# Patient Record
Sex: MALE | Race: WHITE | Employment: FULL TIME | URBAN - METROPOLITAN AREA
[De-identification: names, ages, dates, MRNs, and addresses within clinical notes are randomized per-mention and may not be internally consistent; named-entity substitution may affect disease eponyms.]

---

## 2020-02-13 ENCOUNTER — HOSPITAL ENCOUNTER (OUTPATIENT)
Age: 55
Setting detail: OBSERVATION
Discharge: HOME | End: 2020-02-15
Attending: EMERGENCY MEDICINE | Admitting: FAMILY MEDICINE
Payer: OTHER GOVERNMENT

## 2020-02-13 ENCOUNTER — APPOINTMENT (OUTPATIENT)
Dept: CT IMAGING | Age: 55
End: 2020-02-13
Attending: EMERGENCY MEDICINE
Payer: OTHER GOVERNMENT

## 2020-02-13 ENCOUNTER — APPOINTMENT (OUTPATIENT)
Dept: GENERAL RADIOLOGY | Age: 55
End: 2020-02-13
Attending: EMERGENCY MEDICINE
Payer: OTHER GOVERNMENT

## 2020-02-13 DIAGNOSIS — R19.7 NAUSEA VOMITING AND DIARRHEA: ICD-10-CM

## 2020-02-13 DIAGNOSIS — R10.84 ABDOMINAL PAIN, GENERALIZED: ICD-10-CM

## 2020-02-13 DIAGNOSIS — R55 SYNCOPE AND COLLAPSE: Primary | ICD-10-CM

## 2020-02-13 DIAGNOSIS — R11.2 NAUSEA VOMITING AND DIARRHEA: ICD-10-CM

## 2020-02-13 LAB
ALBUMIN SERPL-MCNC: 4.4 G/DL (ref 3.4–5)
ALBUMIN/GLOB SERPL: 1.3 {RATIO} (ref 0.8–1.7)
ALP SERPL-CCNC: 72 U/L (ref 45–117)
ALT SERPL-CCNC: 49 U/L (ref 16–61)
ANION GAP SERPL CALC-SCNC: 9 MMOL/L (ref 3–18)
APPEARANCE UR: CLEAR
AST SERPL-CCNC: 16 U/L (ref 10–38)
ATRIAL RATE: 131 BPM
BASOPHILS # BLD: 0 K/UL (ref 0–0.1)
BASOPHILS NFR BLD: 0 % (ref 0–2)
BILIRUB SERPL-MCNC: 0.5 MG/DL (ref 0.2–1)
BILIRUB UR QL: NEGATIVE
BUN SERPL-MCNC: 29 MG/DL (ref 7–18)
BUN/CREAT SERPL: 21 (ref 12–20)
CALCIUM SERPL-MCNC: 9.8 MG/DL (ref 8.5–10.1)
CALCULATED P AXIS, ECG09: 77 DEGREES
CALCULATED R AXIS, ECG10: 73 DEGREES
CALCULATED T AXIS, ECG11: 73 DEGREES
CHLORIDE SERPL-SCNC: 110 MMOL/L (ref 100–111)
CK MB CFR SERPL CALC: NORMAL % (ref 0–4)
CK MB SERPL-MCNC: <1 NG/ML (ref 5–25)
CK SERPL-CCNC: 82 U/L (ref 39–308)
CO2 SERPL-SCNC: 21 MMOL/L (ref 21–32)
COLOR UR: YELLOW
CREAT SERPL-MCNC: 1.37 MG/DL (ref 0.6–1.3)
DIAGNOSIS, 93000: NORMAL
DIFFERENTIAL METHOD BLD: ABNORMAL
EOSINOPHIL # BLD: 0 K/UL (ref 0–0.4)
EOSINOPHIL NFR BLD: 0 % (ref 0–5)
ERYTHROCYTE [DISTWIDTH] IN BLOOD BY AUTOMATED COUNT: 12.3 % (ref 11.6–14.5)
GLOBULIN SER CALC-MCNC: 3.3 G/DL (ref 2–4)
GLUCOSE SERPL-MCNC: 136 MG/DL (ref 74–99)
GLUCOSE UR STRIP.AUTO-MCNC: NEGATIVE MG/DL
HCT VFR BLD AUTO: 46.2 % (ref 36–48)
HGB BLD-MCNC: 15.8 G/DL (ref 13–16)
HGB UR QL STRIP: NEGATIVE
KETONES UR QL STRIP.AUTO: NEGATIVE MG/DL
LACTATE SERPL-SCNC: 2.6 MMOL/L (ref 0.4–2)
LACTATE SERPL-SCNC: 2.6 MMOL/L (ref 0.4–2)
LEUKOCYTE ESTERASE UR QL STRIP.AUTO: NEGATIVE
LIPASE SERPL-CCNC: 83 U/L (ref 73–393)
LYMPHOCYTES # BLD: 0.4 K/UL (ref 0.9–3.6)
LYMPHOCYTES NFR BLD: 2 % (ref 21–52)
MCH RBC QN AUTO: 30.6 PG (ref 24–34)
MCHC RBC AUTO-ENTMCNC: 34.2 G/DL (ref 31–37)
MCV RBC AUTO: 89.4 FL (ref 74–97)
MONOCYTES # BLD: 0.5 K/UL (ref 0.05–1.2)
MONOCYTES NFR BLD: 3 % (ref 3–10)
NEUTS SEG # BLD: 17.8 K/UL (ref 1.8–8)
NEUTS SEG NFR BLD: 95 % (ref 40–73)
NITRITE UR QL STRIP.AUTO: NEGATIVE
P-R INTERVAL, ECG05: 150 MS
PH UR STRIP: 5 [PH] (ref 5–8)
PLATELET # BLD AUTO: 308 K/UL (ref 135–420)
PMV BLD AUTO: 10.6 FL (ref 9.2–11.8)
POTASSIUM SERPL-SCNC: 4.6 MMOL/L (ref 3.5–5.5)
PROT SERPL-MCNC: 7.7 G/DL (ref 6.4–8.2)
PROT UR STRIP-MCNC: NEGATIVE MG/DL
Q-T INTERVAL, ECG07: 382 MS
QRS DURATION, ECG06: 74 MS
QTC CALCULATION (BEZET), ECG08: 564 MS
RBC # BLD AUTO: 5.17 M/UL (ref 4.7–5.5)
SODIUM SERPL-SCNC: 140 MMOL/L (ref 136–145)
SP GR UR REFRACTOMETRY: 1.02 (ref 1–1.03)
TROPONIN I SERPL-MCNC: <0.02 NG/ML (ref 0–0.04)
UROBILINOGEN UR QL STRIP.AUTO: 0.2 EU/DL (ref 0.2–1)
VENTRICULAR RATE, ECG03: 131 BPM
WBC # BLD AUTO: 18.7 K/UL (ref 4.6–13.2)

## 2020-02-13 PROCEDURE — 99218 HC RM OBSERVATION: CPT

## 2020-02-13 PROCEDURE — 74011000258 HC RX REV CODE- 258: Performed by: EMERGENCY MEDICINE

## 2020-02-13 PROCEDURE — 74011250636 HC RX REV CODE- 250/636: Performed by: EMERGENCY MEDICINE

## 2020-02-13 PROCEDURE — 83690 ASSAY OF LIPASE: CPT

## 2020-02-13 PROCEDURE — 96376 TX/PRO/DX INJ SAME DRUG ADON: CPT

## 2020-02-13 PROCEDURE — 93005 ELECTROCARDIOGRAM TRACING: CPT

## 2020-02-13 PROCEDURE — 74011636320 HC RX REV CODE- 636/320: Performed by: EMERGENCY MEDICINE

## 2020-02-13 PROCEDURE — 87086 URINE CULTURE/COLONY COUNT: CPT

## 2020-02-13 PROCEDURE — 80053 COMPREHEN METABOLIC PANEL: CPT

## 2020-02-13 PROCEDURE — 83605 ASSAY OF LACTIC ACID: CPT

## 2020-02-13 PROCEDURE — 71045 X-RAY EXAM CHEST 1 VIEW: CPT

## 2020-02-13 PROCEDURE — 87040 BLOOD CULTURE FOR BACTERIA: CPT

## 2020-02-13 PROCEDURE — 74011250637 HC RX REV CODE- 250/637: Performed by: FAMILY MEDICINE

## 2020-02-13 PROCEDURE — 74011250636 HC RX REV CODE- 250/636: Performed by: FAMILY MEDICINE

## 2020-02-13 PROCEDURE — 96366 THER/PROPH/DIAG IV INF ADDON: CPT

## 2020-02-13 PROCEDURE — 74011000258 HC RX REV CODE- 258: Performed by: FAMILY MEDICINE

## 2020-02-13 PROCEDURE — 82550 ASSAY OF CK (CPK): CPT

## 2020-02-13 PROCEDURE — 85025 COMPLETE CBC W/AUTO DIFF WBC: CPT

## 2020-02-13 PROCEDURE — 74177 CT ABD & PELVIS W/CONTRAST: CPT

## 2020-02-13 PROCEDURE — 94762 N-INVAS EAR/PLS OXIMTRY CONT: CPT

## 2020-02-13 PROCEDURE — 96375 TX/PRO/DX INJ NEW DRUG ADDON: CPT

## 2020-02-13 PROCEDURE — 36415 COLL VENOUS BLD VENIPUNCTURE: CPT

## 2020-02-13 PROCEDURE — 81003 URINALYSIS AUTO W/O SCOPE: CPT

## 2020-02-13 PROCEDURE — 74011250636 HC RX REV CODE- 250/636: Performed by: INTERNAL MEDICINE

## 2020-02-13 PROCEDURE — 96372 THER/PROPH/DIAG INJ SC/IM: CPT

## 2020-02-13 PROCEDURE — 96365 THER/PROPH/DIAG IV INF INIT: CPT

## 2020-02-13 PROCEDURE — 99285 EMERGENCY DEPT VISIT HI MDM: CPT

## 2020-02-13 PROCEDURE — 74011250637 HC RX REV CODE- 250/637: Performed by: EMERGENCY MEDICINE

## 2020-02-13 RX ORDER — METOCLOPRAMIDE 5 MG/1
5 TABLET ORAL
Status: DISCONTINUED | OUTPATIENT
Start: 2020-02-13 | End: 2020-02-15 | Stop reason: HOSPADM

## 2020-02-13 RX ORDER — HEPARIN SODIUM 5000 [USP'U]/ML
5000 INJECTION, SOLUTION INTRAVENOUS; SUBCUTANEOUS EVERY 8 HOURS
Status: DISCONTINUED | OUTPATIENT
Start: 2020-02-13 | End: 2020-02-15 | Stop reason: HOSPADM

## 2020-02-13 RX ORDER — HYDROMORPHONE HYDROCHLORIDE 1 MG/ML
1 INJECTION, SOLUTION INTRAMUSCULAR; INTRAVENOUS; SUBCUTANEOUS
Status: DISCONTINUED | OUTPATIENT
Start: 2020-02-13 | End: 2020-02-15 | Stop reason: HOSPADM

## 2020-02-13 RX ORDER — KETOROLAC TROMETHAMINE 30 MG/ML
30 INJECTION, SOLUTION INTRAMUSCULAR; INTRAVENOUS ONCE
Status: COMPLETED | OUTPATIENT
Start: 2020-02-13 | End: 2020-02-13

## 2020-02-13 RX ORDER — SIMETHICONE 80 MG
80 TABLET,CHEWABLE ORAL
Status: COMPLETED | OUTPATIENT
Start: 2020-02-13 | End: 2020-02-13

## 2020-02-13 RX ORDER — METOCLOPRAMIDE HYDROCHLORIDE 5 MG/ML
5 INJECTION INTRAMUSCULAR; INTRAVENOUS
Status: COMPLETED | OUTPATIENT
Start: 2020-02-13 | End: 2020-02-13

## 2020-02-13 RX ORDER — MORPHINE SULFATE 2 MG/ML
4 INJECTION, SOLUTION INTRAMUSCULAR; INTRAVENOUS
Status: DISCONTINUED | OUTPATIENT
Start: 2020-02-13 | End: 2020-02-13

## 2020-02-13 RX ORDER — MORPHINE SULFATE 2 MG/ML
2 INJECTION, SOLUTION INTRAMUSCULAR; INTRAVENOUS
Status: DISCONTINUED | OUTPATIENT
Start: 2020-02-13 | End: 2020-02-13

## 2020-02-13 RX ORDER — SODIUM CHLORIDE 9 MG/ML
75 INJECTION, SOLUTION INTRAVENOUS CONTINUOUS
Status: DISCONTINUED | OUTPATIENT
Start: 2020-02-13 | End: 2020-02-15 | Stop reason: HOSPADM

## 2020-02-13 RX ORDER — ONDANSETRON 2 MG/ML
4 INJECTION INTRAMUSCULAR; INTRAVENOUS
Status: COMPLETED | OUTPATIENT
Start: 2020-02-13 | End: 2020-02-13

## 2020-02-13 RX ORDER — DIPHENHYDRAMINE HYDROCHLORIDE 50 MG/ML
12.5 INJECTION, SOLUTION INTRAMUSCULAR; INTRAVENOUS
Status: DISCONTINUED | OUTPATIENT
Start: 2020-02-13 | End: 2020-02-15 | Stop reason: HOSPADM

## 2020-02-13 RX ORDER — SODIUM CHLORIDE 0.9 % (FLUSH) 0.9 %
5-10 SYRINGE (ML) INJECTION AS NEEDED
Status: DISCONTINUED | OUTPATIENT
Start: 2020-02-13 | End: 2020-02-15 | Stop reason: HOSPADM

## 2020-02-13 RX ORDER — MORPHINE SULFATE 2 MG/ML
4 INJECTION, SOLUTION INTRAMUSCULAR; INTRAVENOUS
Status: COMPLETED | OUTPATIENT
Start: 2020-02-13 | End: 2020-02-13

## 2020-02-13 RX ORDER — NALOXONE HYDROCHLORIDE 0.4 MG/ML
0.4 INJECTION, SOLUTION INTRAMUSCULAR; INTRAVENOUS; SUBCUTANEOUS AS NEEDED
Status: DISCONTINUED | OUTPATIENT
Start: 2020-02-13 | End: 2020-02-15 | Stop reason: HOSPADM

## 2020-02-13 RX ADMIN — HEPARIN SODIUM 5000 UNITS: 5000 INJECTION INTRAVENOUS; SUBCUTANEOUS at 23:16

## 2020-02-13 RX ADMIN — PROMETHAZINE HYDROCHLORIDE 25 MG: 25 INJECTION, SOLUTION INTRAMUSCULAR; INTRAVENOUS at 13:43

## 2020-02-13 RX ADMIN — SIMETHICONE CHEW TAB 80 MG 80 MG: 80 TABLET ORAL at 14:03

## 2020-02-13 RX ADMIN — ONDANSETRON 4 MG: 2 INJECTION INTRAMUSCULAR; INTRAVENOUS at 09:51

## 2020-02-13 RX ADMIN — METOCLOPRAMIDE HYDROCHLORIDE 5 MG: 5 TABLET ORAL at 23:25

## 2020-02-13 RX ADMIN — HEPARIN SODIUM 5000 UNITS: 5000 INJECTION INTRAVENOUS; SUBCUTANEOUS at 17:08

## 2020-02-13 RX ADMIN — SODIUM CHLORIDE 1000 ML: 900 INJECTION, SOLUTION INTRAVENOUS at 19:20

## 2020-02-13 RX ADMIN — KETOROLAC TROMETHAMINE 30 MG: 30 INJECTION, SOLUTION INTRAMUSCULAR at 11:51

## 2020-02-13 RX ADMIN — SODIUM CHLORIDE 125 ML/HR: 900 INJECTION, SOLUTION INTRAVENOUS at 17:08

## 2020-02-13 RX ADMIN — SODIUM CHLORIDE 500 ML: 900 INJECTION, SOLUTION INTRAVENOUS at 19:33

## 2020-02-13 RX ADMIN — HYDROMORPHONE HYDROCHLORIDE 1 MG: 1 INJECTION, SOLUTION INTRAMUSCULAR; INTRAVENOUS; SUBCUTANEOUS at 23:17

## 2020-02-13 RX ADMIN — MORPHINE SULFATE 4 MG: 2 INJECTION, SOLUTION INTRAMUSCULAR; INTRAVENOUS at 10:01

## 2020-02-13 RX ADMIN — IOPAMIDOL 100 ML: 612 INJECTION, SOLUTION INTRAVENOUS at 12:42

## 2020-02-13 RX ADMIN — DIPHENHYDRAMINE HYDROCHLORIDE 12.5 MG: 50 INJECTION, SOLUTION INTRAMUSCULAR; INTRAVENOUS at 22:48

## 2020-02-13 RX ADMIN — SODIUM CHLORIDE 500 ML: 900 INJECTION, SOLUTION INTRAVENOUS at 23:16

## 2020-02-13 RX ADMIN — SODIUM CHLORIDE 1000 ML: 900 INJECTION, SOLUTION INTRAVENOUS at 09:51

## 2020-02-13 RX ADMIN — SODIUM CHLORIDE 1000 ML: 900 INJECTION, SOLUTION INTRAVENOUS at 10:33

## 2020-02-13 RX ADMIN — PIPERACILLIN AND TAZOBACTAM 3.38 G: 3; .375 INJECTION, POWDER, LYOPHILIZED, FOR SOLUTION INTRAVENOUS at 19:33

## 2020-02-13 RX ADMIN — METOCLOPRAMIDE 5 MG: 5 INJECTION, SOLUTION INTRAMUSCULAR; INTRAVENOUS at 15:22

## 2020-02-13 NOTE — ED TRIAGE NOTES
Patient ambulate to ED with nausea, diarrhea, generalized body pain since yesterday, patient had syncopal episode with loss of consciousness at 0200, patient fell forward and hit head, denies being on blood thinners, a/ox4

## 2020-02-13 NOTE — ED NOTES
TRANSFER - OUT REPORT:    Verbal report given to Bronson Fields RN on Dottie Feliciano  being transferred to medical unit for routine progression of care       Report consisted of patients Situation, Background, Assessment and   Recommendations(SBAR). Information from the following report(s) SBAR, ED Summary, Procedure Summary, Intake/Output, MAR and Recent Results was reviewed with the receiving nurse. Lines:   Peripheral IV 02/13/20 Left Antecubital (Active)   Site Assessment Clean, dry, & intact 2/13/2020  9:52 AM   Phlebitis Assessment 0 2/13/2020  9:52 AM   Infiltration Assessment 0 2/13/2020  9:52 AM   Dressing Status Clean, dry, & intact 2/13/2020  9:52 AM   Dressing Type Transparent;Tape 2/13/2020  9:52 AM   Hub Color/Line Status Pink 2/13/2020  9:52 AM   Action Taken Blood drawn 2/13/2020  9:52 AM   Alcohol Cap Used Yes 2/13/2020  9:52 AM        Opportunity for questions and clarification was provided.       Patient transported with:   [employer]

## 2020-02-13 NOTE — ED NOTES
Patient reports feeling bloated and mid lower abdominal pain after drinking 1/2 cup water, Dr. Cielo Romero notified

## 2020-02-13 NOTE — H&P
History & Physical    Patient: Santino Madrigal MRN: [id number]  CSN: [id number]    YOB: 1965  Age: 47 y.o. Sex: male      DOA: 2/13/2020  Primary Care Provider:  Chaz Hunter MD    Assessment/Plan   Acute Enteritis w/ copious diarrhea   Fluid rehydration  Antiemetics  Pain management  Clear diet advance as tolerated  Stool studies -will follow  CT scan showed marked distention of stomach -General surgery consulted and advised that patient did not need NG tube but rather symptomatic management with Reglan    Leukocytosis  SIRS protocol activated based on elevated white blood count -18.7  We will check lactic acid -if elevated will obtain pancultures    Syncope/Presyncope   Likely secondary to dehydration  Recovered well except for sequela of musculoskeletal back pain  Will obtain CT head since mild head trauma    DVT prophylaxis SCDs  GI prophylaxis ordered     Advance care planning:  Persons present for discussion: Patient and his son. The patient has the capacity to make decisions on his own behalf: Yes  The patient has an advanced directive: No  They have a copy of the advanced directive to available for their chart: Not applicable  The patient has appointed decision-maker if incapacitated: Wife  The person/relationship is: Above   The patient has made a decision on CODE STATUS: FULL   CODE STATUS is: FULL     AC: I have discussed with the patient and/or family regarding the CODE STATUS. I have described potential options in the event of a cardiac or respiratory arrest.  I have explained what being \"full code\" entails including cardiopulmonary resuscitation attempts with chest compressions, potential cardioversions or shocks as well as intubation and mechanical life support. I have also explained DO NOT RESUSCITATE which would mean the allowance of a natural death without aggressive interventions.     Time spent on above discussion: 15 minutes    Patient Active Problem List Diagnosis Code    Diarrhea R19.7     Estimated length of stay: 1048 hrs. CC: Abdominal pain with nausea vomiting diarrhea       HPI:     Amy Tamez is a 47 y.o. male with only past medical history of fatty liver disease  and takes no home meds who presents to the emergency department C/O diarrhea, nausea, vomiting, diaphoresis, dizziness that started last night at approximately 11:30 PM.  He reports he felt fine when he went to sleep but then woke suddenly and had copious diarrhea. He reports during one of these episodes he became dizzy and passed out while on the toilet and fell striking his head. He denies any chest pain. He does note he has some back pain since the syncopal episode. Reports that while at work on Monday (today is Thursday) one of his coworkers vomited all over the exit door and floor where he had the passerby to leave the room. Denies fever, chills, night sweats, chest pain, shortness of breath or difficulty breathing. Is only passed out previously once before his adult life and that is when he had the flu. They did a right before he passed out he became very lightheaded and began sweating profusely. Denies a history of seizures. They say when he hit his head there was no laceration or blood but he did slightly make his tongue. Stated that he was given morphine for pain in the emergency room and that only made him feel worse, he did feel better after getting a dose of Toradol. History reviewed. No pertinent past medical history. Past Surgical History:   Procedure Laterality Date    HX VASECTOMY         History reviewed. No pertinent family history.     Social History     Socioeconomic History    Marital status:      Spouse name: Not on file    Number of children: Not on file    Years of education: Not on file    Highest education level: Not on file   Tobacco Use    Smoking status: Never Smoker    Smokeless tobacco: Never Used   Substance and Sexual Activity    Alcohol use: Not Currently     Frequency: Never    Drug use: Never       Prior to Admission medications    Not on File       No Known Allergies    Review of Systems  Gen: No fever, chills, malaise, weight loss/gain. Heent: No headache, rhinorrhea, epistaxis, ear pain, hearing loss, sinus pain, neck pain/stiffness, sore throat. Heart: No chest pain, palpitations, MCNEAL, pnd, or orthopnea. Resp: No cough, hemoptysis, wheezing and shortness of breath. GI: +nausea, vomiting, diarrhea, denies constipation, melena or hematochezia. : No urinary obstruction, dysuria or hematuria. Derm: No rash, new skin lesion or pruritis. Musc/skeletal: no bone or joint complains. Vasc: No edema, cyanosis or claudication. Endo: No heat/cold intolerance, no polyuria,polydipsia or polyphagia. Neuro: No unilateral weakness, numbness, tingling. No seizures. Heme: No easy bruising or bleeding. Physical Exam:     Physical Exam:  Visit Vitals  /54   Pulse 83   Temp 99.3 °F (37.4 °C)   Resp 23   Ht 5' 8\" (1.727 m)   Wt 117.9 kg (260 lb)   SpO2 97%   BMI 39.53 kg/m²      O2 Device: Room air    Temp (24hrs), Av.3 °F (37.4 °C), Min:99.3 °F (37.4 °C), Max:99.3 °F (37.4 °C)    701 - 1900  In:  [I.V.:]  Out: 350 [Urine:350]   No intake/output data recorded. General:  Awake, cooperative, appears ill    Head:  Normocephalic, without obvious abnormality, atraumatic. Eyes:  Conjunctivae/corneas clear, sclera anicteric, PERRL, EOMs intact. Nose: Nares normal. No drainage or sinus tenderness. Throat: Lips, mucosa, and tongue normal.    Neck: Supple, symmetrical, trachea midline, no adenopathy. Lungs:   Clear to auscultation bilaterally. Heart:  Regular rate and rhythm, S1, S2 normal, no murmur, click, rub or gallop. Abdomen: Soft, non-tender. Significant distention, Bowel sounds normal. No masses,  No organomegaly.    Extremities: Extremities normal, atraumatic, no cyanosis or edema. Capillary refill normal.   Pulses: 2+ and symmetric all extremities. Skin: Skin color pink, turgor normal. No rashes or lesions   Neurologic: CNII-XII intact. No focal motor or sensory deficit. Labs Reviewed:  Recent Results (from the past 24 hour(s))   EKG, 12 LEAD, INITIAL    Collection Time: 02/13/20  9:40 AM   Result Value Ref Range    Ventricular Rate 131 BPM    Atrial Rate 131 BPM    P-R Interval 150 ms    QRS Duration 74 ms    Q-T Interval 382 ms    QTC Calculation (Bezet) 564 ms    Calculated P Axis 77 degrees    Calculated R Axis 73 degrees    Calculated T Axis 73 degrees    Diagnosis       Sinus tachycardia  Possible Left atrial enlargement  Nonspecific T wave abnormality  Abnormal ECG  No previous ECGs available     CBC WITH AUTOMATED DIFF    Collection Time: 02/13/20  9:45 AM   Result Value Ref Range    WBC 18.7 (H) 4.6 - 13.2 K/uL    RBC 5.17 4.70 - 5.50 M/uL    HGB 15.8 13.0 - 16.0 g/dL    HCT 46.2 36.0 - 48.0 %    MCV 89.4 74.0 - 97.0 FL    MCH 30.6 24.0 - 34.0 PG    MCHC 34.2 31.0 - 37.0 g/dL    RDW 12.3 11.6 - 14.5 %    PLATELET 046 391 - 482 K/uL    MPV 10.6 9.2 - 11.8 FL    NEUTROPHILS 95 (H) 40 - 73 %    LYMPHOCYTES 2 (L) 21 - 52 %    MONOCYTES 3 3 - 10 %    EOSINOPHILS 0 0 - 5 %    BASOPHILS 0 0 - 2 %    ABS. NEUTROPHILS 17.8 (H) 1.8 - 8.0 K/UL    ABS. LYMPHOCYTES 0.4 (L) 0.9 - 3.6 K/UL    ABS. MONOCYTES 0.5 0.05 - 1.2 K/UL    ABS. EOSINOPHILS 0.0 0.0 - 0.4 K/UL    ABS.  BASOPHILS 0.0 0.0 - 0.1 K/UL    DF AUTOMATED     METABOLIC PANEL, COMPREHENSIVE    Collection Time: 02/13/20  9:45 AM   Result Value Ref Range    Sodium 140 136 - 145 mmol/L    Potassium 4.6 3.5 - 5.5 mmol/L    Chloride 110 100 - 111 mmol/L    CO2 21 21 - 32 mmol/L    Anion gap 9 3.0 - 18 mmol/L    Glucose 136 (H) 74 - 99 mg/dL    BUN 29 (H) 7.0 - 18 MG/DL    Creatinine 1.37 (H) 0.6 - 1.3 MG/DL    BUN/Creatinine ratio 21 (H) 12 - 20      GFR est AA >60 >60 ml/min/1.73m2    GFR est non-AA 54 (L) >60 ml/min/1.73m2    Calcium 9.8 8.5 - 10.1 MG/DL    Bilirubin, total 0.5 0.2 - 1.0 MG/DL    ALT (SGPT) 49 16 - 61 U/L    AST (SGOT) 16 10 - 38 U/L    Alk.  phosphatase 72 45 - 117 U/L    Protein, total 7.7 6.4 - 8.2 g/dL    Albumin 4.4 3.4 - 5.0 g/dL    Globulin 3.3 2.0 - 4.0 g/dL    A-G Ratio 1.3 0.8 - 1.7     LIPASE    Collection Time: 02/13/20  9:45 AM   Result Value Ref Range    Lipase 83 73 - 393 U/L   CARDIAC PANEL,(CK, CKMB & TROPONIN)    Collection Time: 02/13/20  9:45 AM   Result Value Ref Range    CK 82 39 - 308 U/L    CK - MB <1.0 <3.6 ng/ml    CK-MB Index  0.0 - 4.0 %     CALCULATION NOT PERFORMED WHEN RESULT IS BELOW LINEAR LIMIT    Troponin-I, QT <0.02 0.0 - 0.045 NG/ML   URINALYSIS W/ RFLX MICROSCOPIC    Collection Time: 02/13/20 11:50 AM   Result Value Ref Range    Color YELLOW      Appearance CLEAR      Specific gravity 1.024 1.005 - 1.030      pH (UA) 5.0 5.0 - 8.0      Protein NEGATIVE  NEG mg/dL    Glucose NEGATIVE  NEG mg/dL    Ketone NEGATIVE  NEG mg/dL    Bilirubin NEGATIVE  NEG      Blood NEGATIVE  NEG      Urobilinogen 0.2 0.2 - 1.0 EU/dL    Nitrites NEGATIVE  NEG      Leukocyte Esterase NEGATIVE  NEG         Procedures/imaging: see electronic medical records for all procedures/Xrays and details which were not copied into this note but were reviewed prior to creation of Plan    CC: Miquel Trinidad MD

## 2020-02-13 NOTE — PROGRESS NOTES
1615- TRANSFER - IN REPORT:    Verbal report received from [facility] RN(name) on Criss Alcantara  being received from ED (unit) for routine progression of care      Report consisted of patients Situation, Background, Assessment and   Recommendations(SBAR). Information from the following report(s) SBAR, Kardex, ED Summary and MAR was reviewed with the receiving nurse. Opportunity for questions and clarification was provided. Assessment completed upon patients arrival to unit and care assumed. [id number] Primary Nurse Mandie Pina RN and Dr Danielle Batista performed a dual skin assessment on this patient No impairment noted  Jimmy score is 20    1830- Page placed for Dr. Danielle Batista to inform her of patient's lactic acid level of 2.6    1832- Notified Dr. Adin Nails of patient's lactic acid. Sepsis bundle activated. Verbal orders received. Patient has, reportedly, had no occurrences of diarrhea since being admitted to floor. 1950-Bedside and Verbal shift change report given to HUA Ruiz (oncoming nurse) by Daphne Law RN (offgoing nurse). Report included the following information SBAR, Kardex and MAR. Also informed THANH Chappell that patient needs to void by 2100, if not, patient needs to be bladder scanned, per Dr. Danielle Batista.

## 2020-02-13 NOTE — ED PROVIDER NOTES
EMERGENCY DEPARTMENT HISTORY AND PHYSICAL EXAM    Date: 2/13/2020  Patient Name: Geovanni Wadsworth    History of Presenting Illness     Chief Complaint   Patient presents with    Dizziness    Nausea         History Provided By: Patient and Patient's Wife    9:38 AM  Geovanni Wadsworth is a 47 y.o. male with PMHX of fatty liver disease who presents to the emergency department C/O diarrhea, nausea, vomiting, diaphoresis, dizziness that started last night. He reports he felt fine when he went to sleep but then woke suddenly and had copious diarrhea. He reports during 1 of these episodes he became dizzy and passed out while on the toilet and fell striking his head. He denies any chest pain. He does note he has some back pain since the syncopal episode. Denies any questionable food intake or recent travel or other sick contacts. PCP: Chaparro Ross MD    Current Facility-Administered Medications   Medication Dose Route Frequency Provider Last Rate Last Dose    morphine injection 4 mg  4 mg IntraVENous NOW Selina Ratliff MD           Past History     Past Medical History:  History reviewed. No pertinent past medical history. Past Surgical History:  Past Surgical History:   Procedure Laterality Date    HX VASECTOMY         Family History:  History reviewed. No pertinent family history. Social History:  Social History     Tobacco Use    Smoking status: Never Smoker    Smokeless tobacco: Never Used   Substance Use Topics    Alcohol use: Not Currently     Frequency: Never    Drug use: Never       Allergies:  No Known Allergies      Review of Systems   Review of Systems   Constitutional: Negative for fever. Respiratory: Negative for shortness of breath. Cardiovascular: Negative for chest pain. Gastrointestinal: Positive for abdominal pain, diarrhea, nausea and vomiting. Musculoskeletal: Positive for back pain. Neurological: Positive for syncope.    All other systems reviewed and are negative.         Physical Exam     Vitals:    02/13/20 1230 02/13/20 1300 02/13/20 1400 02/13/20 1500   BP: (!) 139/98 107/61 114/64 116/54   Pulse: 92 88 88 83   Resp: 20 15 20 23   Temp:       SpO2: 99% 98% 97% 97%   Weight:       Height:         Physical Exam    Nursing notes and vital signs reviewed    Constitutional: Non toxic appearing, moderate distress  Head: Normocephalic, Atraumatic  Eyes: Pupils are equal, round, and reactive to light, EOMI  Neck: Supple  Cardiovascular: Tachycardic and regular rhythm, no murmurs, rubs, or gallops  Chest: Normal work of breathing and chest excursion bilaterally  Lungs: Clear to ausculation bilaterally  Abdomen: Soft, non tender, non distended, normoactive bowel sounds  Back: No evidence of trauma or deformity  Extremities: No evidence of trauma or deformity  Skin: Warm and dry, normal cap refill  Neuro: Alert and appropriate, CN intact, normal speech, strength and sensation full and symmetric bilaterally, normal gait, normal coordination  Psychiatric: Normal mood and affect      Diagnostic Study Results     Labs -     Recent Results (from the past 12 hour(s))   EKG, 12 LEAD, INITIAL    Collection Time: 02/13/20  9:40 AM   Result Value Ref Range    Ventricular Rate 131 BPM    Atrial Rate 131 BPM    P-R Interval 150 ms    QRS Duration 74 ms    Q-T Interval 382 ms    QTC Calculation (Bezet) 564 ms    Calculated P Axis 77 degrees    Calculated R Axis 73 degrees    Calculated T Axis 73 degrees    Diagnosis       Sinus tachycardia  Possible Left atrial enlargement  Nonspecific T wave abnormality  Abnormal ECG  No previous ECGs available     CBC WITH AUTOMATED DIFF    Collection Time: 02/13/20  9:45 AM   Result Value Ref Range    WBC 18.7 (H) 4.6 - 13.2 K/uL    RBC 5.17 4.70 - 5.50 M/uL    HGB 15.8 13.0 - 16.0 g/dL    HCT 46.2 36.0 - 48.0 %    MCV 89.4 74.0 - 97.0 FL    MCH 30.6 24.0 - 34.0 PG    MCHC 34.2 31.0 - 37.0 g/dL    RDW 12.3 11.6 - 14.5 %    PLATELET 132 930 - 610 K/uL MPV 10.6 9.2 - 11.8 FL    NEUTROPHILS 95 (H) 40 - 73 %    LYMPHOCYTES 2 (L) 21 - 52 %    MONOCYTES 3 3 - 10 %    EOSINOPHILS 0 0 - 5 %    BASOPHILS 0 0 - 2 %    ABS. NEUTROPHILS 17.8 (H) 1.8 - 8.0 K/UL    ABS. LYMPHOCYTES 0.4 (L) 0.9 - 3.6 K/UL    ABS. MONOCYTES 0.5 0.05 - 1.2 K/UL    ABS. EOSINOPHILS 0.0 0.0 - 0.4 K/UL    ABS. BASOPHILS 0.0 0.0 - 0.1 K/UL    DF AUTOMATED     METABOLIC PANEL, COMPREHENSIVE    Collection Time: 02/13/20  9:45 AM   Result Value Ref Range    Sodium 140 136 - 145 mmol/L    Potassium 4.6 3.5 - 5.5 mmol/L    Chloride 110 100 - 111 mmol/L    CO2 21 21 - 32 mmol/L    Anion gap 9 3.0 - 18 mmol/L    Glucose 136 (H) 74 - 99 mg/dL    BUN 29 (H) 7.0 - 18 MG/DL    Creatinine 1.37 (H) 0.6 - 1.3 MG/DL    BUN/Creatinine ratio 21 (H) 12 - 20      GFR est AA >60 >60 ml/min/1.73m2    GFR est non-AA 54 (L) >60 ml/min/1.73m2    Calcium 9.8 8.5 - 10.1 MG/DL    Bilirubin, total 0.5 0.2 - 1.0 MG/DL    ALT (SGPT) 49 16 - 61 U/L    AST (SGOT) 16 10 - 38 U/L    Alk.  phosphatase 72 45 - 117 U/L    Protein, total 7.7 6.4 - 8.2 g/dL    Albumin 4.4 3.4 - 5.0 g/dL    Globulin 3.3 2.0 - 4.0 g/dL    A-G Ratio 1.3 0.8 - 1.7     LIPASE    Collection Time: 02/13/20  9:45 AM   Result Value Ref Range    Lipase 83 73 - 393 U/L   CARDIAC PANEL,(CK, CKMB & TROPONIN)    Collection Time: 02/13/20  9:45 AM   Result Value Ref Range    CK 82 39 - 308 U/L    CK - MB <1.0 <3.6 ng/ml    CK-MB Index  0.0 - 4.0 %     CALCULATION NOT PERFORMED WHEN RESULT IS BELOW LINEAR LIMIT    Troponin-I, QT <0.02 0.0 - 0.045 NG/ML   URINALYSIS W/ RFLX MICROSCOPIC    Collection Time: 02/13/20 11:50 AM   Result Value Ref Range    Color YELLOW      Appearance CLEAR      Specific gravity 1.024 1.005 - 1.030      pH (UA) 5.0 5.0 - 8.0      Protein NEGATIVE  NEG mg/dL    Glucose NEGATIVE  NEG mg/dL    Ketone NEGATIVE  NEG mg/dL    Bilirubin NEGATIVE  NEG      Blood NEGATIVE  NEG      Urobilinogen 0.2 0.2 - 1.0 EU/dL    Nitrites NEGATIVE  NEG Leukocyte Esterase NEGATIVE  NEG         Radiologic Studies -   CT ABD PELV W CONT   Final Result   IMPRESSION:      1.  Marked distention of the stomach, potentially benefiting from nasogastric   decompression. No focal obstructing mass lesion is appreciated. Otherwise, no   acute inflammatory process in the abdomen or pelvis. 2. Subcutaneous stranding overlying the right gluteal musculature may represent   contusion. 3. Several subtle nonspecific subcentimeter hypodensities in the liver are too   small to characterize, possibly small hemangiomas. XR CHEST PORT   Final Result   IMPRESSION:      Lungs are mildly underexpanded without superimposed acute radiographic   abnormality. CT Results  (Last 48 hours)               02/13/20 1253  CT ABD PELV W CONT Final result    Impression:  IMPRESSION:       1.  Marked distention of the stomach, potentially benefiting from nasogastric   decompression. No focal obstructing mass lesion is appreciated. Otherwise, no   acute inflammatory process in the abdomen or pelvis. 2. Subcutaneous stranding overlying the right gluteal musculature may represent   contusion. 3. Several subtle nonspecific subcentimeter hypodensities in the liver are too   small to characterize, possibly small hemangiomas. Narrative:  EXAM: CT of the abdomen and pelvis       CLINICAL INDICATION/HISTORY: abd pain     > Additional: None. COMPARISON: None. > Reference Exam: None. TECHNIQUE: Axial CT imaging of the abdomen and pelvis was performed with   intravenous contrast. Multiplanar reformats were generated. One or more dose   reduction techniques were used on this CT: automated exposure control,   adjustment of the mAs and/or kVp according to patient size, and iterative   reconstruction techniques. The specific techniques used on this CT exam have   been documented in the patient's electronic medical record.   Digital Imaging and   Communications in Medicine (DICOM) format image data are available to   nonaffiliated external healthcare facilities or entities on a secure, media   free, reciprocally searchable basis with patient authorization for at least a   12-month period after this study. _______________       FINDINGS:       LOWER CHEST: Mild dependent atelectasis. LIVER, BILIARY: There are a few subcentimeter hypodensities in the dome of the   right hepatic lobe as well as more centrally which are nonspecific, not clearly   cysts but most likely benign and may represent small hemangiomas. No biliary   dilation. Gallbladder is unremarkable. PANCREAS: Normal.       SPLEEN: Normal.       ADRENALS: Normal.       KIDNEYS/URETERS/BLADDER: Normal.       LYMPH NODES: No enlarged lymph nodes. GASTROINTESTINAL TRACT: The stomach is markedly distended with ingested debris   and air-fluid level. No bowel dilation or wall thickening. Normal appendix. Several nondilated fluid-filled loops of bowel are noted throughout the abdomen. PELVIC ORGANS: Unremarkable. VASCULATURE: Unremarkable. BONES: No acute or aggressive osseous abnormalities identified. Unilateral pars   defect noted on the right, chronic. OTHER: No ascites. Stranding along the subcutaneous fat overlying the right   lateral gluteal musculature. _______________               CXR Results  (Last 48 hours)               02/13/20 0954  XR CHEST PORT Final result    Impression:  IMPRESSION:       Lungs are mildly underexpanded without superimposed acute radiographic   abnormality. Narrative:  EXAM: XR CHEST PORT       CLINICAL INDICATION/HISTORY: syncope   -Additional: None       COMPARISON: None       TECHNIQUE: Frontal view of the chest       _______________       FINDINGS:       HEART AND MEDIASTINUM: Normal cardiac size and mediastinal contours. LUNGS AND PLEURAL SPACES: Lungs are mildly underexpanded but clear. No focal   pneumonic opacity.  No pneumothorax or pleural effusion. BONY THORAX AND SOFT TISSUES: No acute osseous abnormality       _______________                 Medications given in the ED-  Medications   morphine injection 4 mg (has no administration in time range)   sodium chloride 0.9 % bolus infusion 1,000 mL (0 mL IntraVENous IV Completed 2/13/20 1047)   ondansetron (ZOFRAN) injection 4 mg (4 mg IntraVENous Given 2/13/20 0951)   morphine injection 4 mg (4 mg IntraVENous Given 2/13/20 1001)   sodium chloride 0.9 % bolus infusion 1,000 mL (0 mL IntraVENous IV Completed 2/13/20 1152)   ketorolac (TORADOL) injection 30 mg (30 mg IntraVENous Given 2/13/20 1151)   iopamidoL (ISOVUE 300) 61 % contrast injection  mL (100 mL IntraVENous Given 2/13/20 1242)   promethazine (PHENERGAN) 25 mg in 0.9% sodium chloride 50 mL IVPB (0 mg IntraVENous IV Completed 2/13/20 1358)   simethicone (MYLICON) tablet 80 mg (80 mg Oral Given 2/13/20 1403)   metoclopramide HCl (REGLAN) injection 5 mg (5 mg IntraVENous Given 2/13/20 1522)         Medical Decision Making   I am the first provider for this patient. I reviewed the vital signs, available nursing notes, past medical history, past surgical history, family history and social history. Vital Signs-Reviewed the patient's vital signs. Pulse Oximetry Analysis -98 % on room air    Cardiac Monitor:  Rate: 119 bpm  Rhythm: Sinus tachycardia    EKG interpretation: (Preliminary)  EKG read by Dr. Cande Echols at 9:43 AM  Sinus tachycardia at a rate of 131 bpm, [place] interval 150 ms, QRS of 74 ms, no prior available for comparison    Records Reviewed: Nursing Notes    Provider Notes (Medical Decision Making): Yasmine Cabrera is a 47 y.o. male presenting with sudden onset of diarrhea with nausea and vomiting and abdominal pain during the night. Had one episode of syncope during an episode of diarrhea.   Here labs are benign other than elevated white count but after multiple rounds of antiemetics and pain control patient still cannot tolerate p.o. Other than distended stomach no acute abnormality on CT. Discussed with general surgery who does not recommend NG tube. As patient is still unable to tolerate p.o. discussed with hospitalist for in-hospital observation. Patient and wife understand and agree with this plan. Procedures:  Procedures    ED Course:   11:55 AM  Patient is complaining of increased abdominal pain and nausea after a few sips of water will order CT abdomen to better assess. On reexam abdomen remains soft but now has some tenderness in the bilateral lower quadrants without rebound or guarding    1:26 PM  Updated patient on all results and plan. All questions answered. Patient continues to feel nauseous and unable to tolerate p.o. Will order additional nausea medication. 2:55 PM  Patient is still unable to tolerate p.o. Will consult hospitalist.    CONSULT NOTE:   3:04 PM  Dr. Sheri Persaud spoke with Dr. Lesli Willoughby: General Surgery  Discussed pt's hx, disposition, and available diagnostic and imaging results over the telephone. Reviewed care plans. Does not recommend an NG tube. Recommends trying Reglan. CONSULT NOTE:   3:22 PM  Dr. Sheri Persaud spoke with Dr. Dav Morton  Specialty: Hospitalist  Discussed pt's hx, disposition, and available diagnostic and imaging results over the telephone. Reviewed care plans. Accepts for observation on medical.          Diagnosis and Disposition     Critical Care Time: None    Core Measures:  For Hospitalized Patients:    1. Hospitalization Decision Time:  The decision to hospitalize the patient was made by Dr. Sheri Persaud at 3:15 PM on 2/13/2020    2. Aspirin: Aspirin was not given because the patient did not present with a stroke at the time of their Emergency Department evaluation    3:04 PM  Patient is being admitted to the hospital by Dr. Dav Morton.  The results of their tests and reasons for their admission have been discussed with them and/or available family. They convey agreement and understanding for the need to be admitted and for their admission diagnosis. CONDITIONS ON ADMISSION:  Sepsis is not present at the time of admission. Deep Vein Thrombosis is not present at the time of admission. Thrombosis is not present at the time of admission. Urinary Tract Infection is not present at the time of admission. Pneumonia is not present at the time of admission. MRSA is not present at the time of admission. Wound infection is not present at the time of admission. Pressure Ulcer is not present at the time of admission. CLINICAL IMPRESSION:    1. Syncope and collapse    2. Nausea vomiting and diarrhea    3. Abdominal pain, generalized      _______________________________      Please note that this dictation was completed with [employer], the computer voice recognition software. Quite often unanticipated grammatical, syntax, homophones, and other interpretive errors are inadvertently transcribed by the computer software. Please disregard these errors. Please excuse any errors that have escaped final proofreading.

## 2020-02-14 PROBLEM — R55 SYNCOPE: Status: ACTIVE | Noted: 2020-02-14

## 2020-02-14 PROBLEM — E86.0 DEHYDRATION: Status: ACTIVE | Noted: 2020-02-14

## 2020-02-14 LAB
ANION GAP SERPL CALC-SCNC: 6 MMOL/L (ref 3–18)
APPEARANCE UR: CLEAR
BILIRUB UR QL: NEGATIVE
BUN SERPL-MCNC: 23 MG/DL (ref 7–18)
BUN/CREAT SERPL: 23 (ref 12–20)
C DIFF GDH STL QL: NEGATIVE
C DIFF TOX A+B STL QL IA: NEGATIVE
CALCIUM SERPL-MCNC: 7.7 MG/DL (ref 8.5–10.1)
CHLORIDE SERPL-SCNC: 114 MMOL/L (ref 100–111)
CO2 SERPL-SCNC: 20 MMOL/L (ref 21–32)
COLOR UR: YELLOW
CREAT SERPL-MCNC: 0.99 MG/DL (ref 0.6–1.3)
ERYTHROCYTE [DISTWIDTH] IN BLOOD BY AUTOMATED COUNT: 12.6 % (ref 11.6–14.5)
GLUCOSE SERPL-MCNC: 100 MG/DL (ref 74–99)
GLUCOSE UR STRIP.AUTO-MCNC: NEGATIVE MG/DL
HCT VFR BLD AUTO: 40.2 % (ref 36–48)
HGB BLD-MCNC: 13.3 G/DL (ref 13–16)
HGB UR QL STRIP: NEGATIVE
INTERPRETATION: NORMAL
KETONES UR QL STRIP.AUTO: NEGATIVE MG/DL
LACTATE SERPL-SCNC: 2.1 MMOL/L (ref 0.4–2)
LEUKOCYTE ESTERASE UR QL STRIP.AUTO: NEGATIVE
MCH RBC QN AUTO: 29.8 PG (ref 24–34)
MCHC RBC AUTO-ENTMCNC: 33.1 G/DL (ref 31–37)
MCV RBC AUTO: 90.1 FL (ref 74–97)
NITRITE UR QL STRIP.AUTO: NEGATIVE
PH UR STRIP: 5.5 [PH] (ref 5–8)
PLATELET # BLD AUTO: 252 K/UL (ref 135–420)
PMV BLD AUTO: 10.1 FL (ref 9.2–11.8)
POTASSIUM SERPL-SCNC: 4.3 MMOL/L (ref 3.5–5.5)
PROT UR STRIP-MCNC: NEGATIVE MG/DL
RBC # BLD AUTO: 4.46 M/UL (ref 4.7–5.5)
SODIUM SERPL-SCNC: 140 MMOL/L (ref 136–145)
SP GR UR REFRACTOMETRY: 1.02 (ref 1–1.03)
UROBILINOGEN UR QL STRIP.AUTO: 0.2 EU/DL (ref 0.2–1)
WBC # BLD AUTO: 5.5 K/UL (ref 4.6–13.2)

## 2020-02-14 PROCEDURE — 96376 TX/PRO/DX INJ SAME DRUG ADON: CPT

## 2020-02-14 PROCEDURE — 80048 BASIC METABOLIC PNL TOTAL CA: CPT

## 2020-02-14 PROCEDURE — 36415 COLL VENOUS BLD VENIPUNCTURE: CPT

## 2020-02-14 PROCEDURE — 81003 URINALYSIS AUTO W/O SCOPE: CPT

## 2020-02-14 PROCEDURE — 74011250636 HC RX REV CODE- 250/636: Performed by: FAMILY MEDICINE

## 2020-02-14 PROCEDURE — 74011000258 HC RX REV CODE- 258: Performed by: FAMILY MEDICINE

## 2020-02-14 PROCEDURE — 83605 ASSAY OF LACTIC ACID: CPT

## 2020-02-14 PROCEDURE — 0107U C DIFF TOX AG DETCJ IA STOOL: CPT

## 2020-02-14 PROCEDURE — 99218 HC RM OBSERVATION: CPT

## 2020-02-14 PROCEDURE — 87506 IADNA-DNA/RNA PROBE TQ 6-11: CPT

## 2020-02-14 PROCEDURE — 74011250636 HC RX REV CODE- 250/636: Performed by: HOSPITALIST

## 2020-02-14 PROCEDURE — 96372 THER/PROPH/DIAG INJ SC/IM: CPT

## 2020-02-14 PROCEDURE — 85027 COMPLETE CBC AUTOMATED: CPT

## 2020-02-14 PROCEDURE — 96375 TX/PRO/DX INJ NEW DRUG ADDON: CPT

## 2020-02-14 RX ORDER — FUROSEMIDE 10 MG/ML
40 INJECTION INTRAMUSCULAR; INTRAVENOUS ONCE
Status: COMPLETED | OUTPATIENT
Start: 2020-02-14 | End: 2020-02-14

## 2020-02-14 RX ADMIN — FUROSEMIDE 40 MG: 10 INJECTION, SOLUTION INTRAMUSCULAR; INTRAVENOUS at 16:27

## 2020-02-14 RX ADMIN — HEPARIN SODIUM 5000 UNITS: 5000 INJECTION INTRAVENOUS; SUBCUTANEOUS at 07:19

## 2020-02-14 RX ADMIN — PIPERACILLIN AND TAZOBACTAM 3.38 G: 3; .375 INJECTION, POWDER, LYOPHILIZED, FOR SOLUTION INTRAVENOUS at 01:06

## 2020-02-14 RX ADMIN — PIPERACILLIN AND TAZOBACTAM 3.38 G: 3; .375 INJECTION, POWDER, LYOPHILIZED, FOR SOLUTION INTRAVENOUS at 07:19

## 2020-02-14 RX ADMIN — PIPERACILLIN AND TAZOBACTAM 3.38 G: 3; .375 INJECTION, POWDER, LYOPHILIZED, FOR SOLUTION INTRAVENOUS at 19:35

## 2020-02-14 RX ADMIN — HEPARIN SODIUM 5000 UNITS: 5000 INJECTION INTRAVENOUS; SUBCUTANEOUS at 16:27

## 2020-02-14 RX ADMIN — SODIUM CHLORIDE 125 ML/HR: 900 INJECTION, SOLUTION INTRAVENOUS at 13:31

## 2020-02-14 RX ADMIN — PIPERACILLIN AND TAZOBACTAM 3.38 G: 3; .375 INJECTION, POWDER, LYOPHILIZED, FOR SOLUTION INTRAVENOUS at 13:45

## 2020-02-14 RX ADMIN — SODIUM CHLORIDE 125 ML/HR: 900 INJECTION, SOLUTION INTRAVENOUS at 04:23

## 2020-02-14 NOTE — PROGRESS NOTES
Problem: Risk for Spread of Infection  Goal: Prevent transmission of infectious organism to others  Description  Prevent the transmission of infectious organisms to other patients, staff members, and visitors. Outcome: Progressing Towards Goal     Problem: Falls - Risk of  Goal: *Absence of Falls  Description  Document Deo Santacruz Fall Risk and appropriate interventions in the flowsheet.   Outcome: Progressing Towards Goal  Note: Fall Risk Interventions:  Mobility Interventions: Bed/chair exit alarm, Patient to call before getting OOB         Medication Interventions: Patient to call before getting OOB    Elimination Interventions: Bed/chair exit alarm, Call light in reach, Patient to call for help with toileting needs, Urinal in reach    History of Falls Interventions: Bed/chair exit alarm, Door open when patient unattended

## 2020-02-14 NOTE — PROGRESS NOTES
1949  Bedside and Verbal shift change report given by NANCY Richards (off going nurse) to Joan Metz RN (on coming). Report included the following information SBAR, Kardex, OR Summary, Intake/Output and MAR.

## 2020-02-14 NOTE — PROGRESS NOTES
Reason for Admission:   Abdominal pain with nausea vomiting diarrhea                   RUR Score:   10%                  Plan for utilizing home health:   Unlikely                        Current Advanced Directive/Advance Care Plan: Not on file                         Transition of Care Plan:  Home with physician follow up                     Chart reviewed. Per H&P \"Guy Guerrier is a 47 y.o. male with only past medical history of fatty liver disease  and takes no home meds who presents to the emergency department C/O diarrhea, nausea, vomiting, diaphoresis, dizziness that started last night at approximately 11:30 PM. Mala Mao reports he felt fine when he went to sleep but then woke suddenly and had copious diarrhea.  He reports during one of these episodes he became dizzy and passed out while on the toilet and fell striking his head.  He denies any chest pain.  He does note he has some back pain since the syncopal episode. Reports that while at work on Monday (today is Thursday) one of his coworkers vomited all over the exit door and floor where he had the passerby to leave the room. Denies fever, chills, night sweats, chest pain, shortness of breath or difficulty breathing. Is only passed out previously once before his adult life and that is when he had the flu. They did a right before he passed out he became very lightheaded and began sweating profusely. Denies a history of seizures. They say when he hit his head there was no laceration or blood but he did slightly make his tongue. Stated that he was given morphine for pain in the emergency room and that only made him feel worse, he did feel better after getting a dose of Toradol. \"    1300:  CM spoke with pt to discuss transition of care. Pt is  and his wife will assist as needed. Pt does not have or use DME. Pt is independent. No transition of care need shave been identified. Please encourage ambulation as appropriate.   Anticipate pt will transition home with in the next 24-48 hours with physician follow up. Care Management Interventions  PCP Verified by CM: Yes  Mode of Transport at Discharge:  Other (see comment)(Family)  Transition of Care Consult (CM Consult): Discharge Planning  Health Maintenance Reviewed: Yes  Current Support Network: Lives with Spouse  The Plan for Transition of Care is Related to the Following Treatment Goals : home with physician follow up  Discharge Location  Discharge Placement: Home with family assistance\

## 2020-02-14 NOTE — PROGRESS NOTES
1950  Bedside and Verbal shift change report given to THANH Rayo RN (oncoming nurse) by Damion Tamez RN (offgoing nurse). Report included the following information SBAR and Kardex. 2145  Pt complaining of itching and swelling of the hands    2156  Paged hospitalist    2220  Dr. Melonie Marrero stated to give pt Benadryl    2300  Pt complains of pain but refuses morphine    2305  Dr Melonie Marrero stated to discontinue the morphine and ordered dilaudid  Also notified her of the critical lactic acid of 2.6; she stated to give 500 bolus     0740  Bedside and Verbal shift change report given to Hugo Pena RN (oncoming nurse) by THANH Rayo RN (offgoing nurse). Report included the following information SBAR, Kardex, MAR and Recent Results.

## 2020-02-14 NOTE — PROGRESS NOTES
Hospitalist Progress Note    Patient: Dottie Feliciano MRN: [id number]  CSN: [id number]    YOB: 1965  Age: 47 y.o. Sex: male    DOA: 2/13/2020 LOS:  LOS: 0 days          Chief Complaint:      N/V/D    Assessment/Plan   48 yo previosuly healthy WM admitted for dehydration, N/V/D, and passed out off toilet    Acute infectious gastroenteritis  Syncope (off toilet with vasovagal effect)  dehydration    Continue empiric IV abx  Continue IVF hydration    Repeat labs in am    Advance diet slowly    Disposition :  Patient Active Problem List   Diagnosis Code    Diarrhea R19.7    Syncope R55    Dehydration E86.0       Subjective:  A little better  But still with 4-5 loose stools since last night  Tolerated clears PO this am      Review of systems:    Constitutional: denies fevers, chills  Cardiovascular: denies chest pain  Gastrointestinal: denies nausea, vomiting      Vital signs/Intake and Output:  Visit Vitals  /63   Pulse 75   Temp 98 °F (36.7 °C)   Resp 19   Ht 5' 8\" (1.727 m)   Wt 117.9 kg (260 lb)   SpO2 97%   BMI 39.53 kg/m²     Current Shift:  No intake/output data recorded.   Last three shifts:  02/12 1901 - 02/14 0700  In: 2050 [I.V.:2050]  Out: 1150 [Urine:1150]    Exam:    General: Well developed, alert, NAD, OX3  CVS:Regular rate and rhythm, no M/R/G, S1/S2 heard, no thrill  Lungs:Clear to auscultation bilaterally, no wheezes, rhonchi, or rales  Abdomen: Soft, Nontender, No distention, Normal Bowel sounds, No hepatomegaly  Extremities: No C/C/E, pulses palpable 2+  Neuro:grossly normal , follows commands  Psych:appropriate                Labs: Results:       Chemistry Recent Labs     02/14/20  0100 02/13/20  0945   * 136*    140   K 4.3 4.6   * 110   CO2 20* 21   BUN 23* 29*   CREA 0.99 1.37*   CA 7.7* 9.8   AGAP 6 9   BUCR 23* 21*   AP  --  72   TP  --  7.7   ALB  --  4.4   GLOB  --  3.3   AGRAT  --  1.3      CBC w/Diff Recent Labs     02/14/20  0101 02/13/20  0945   WBC 5.5 18.7*   RBC 4.46* 5.17   HGB 13.3 15.8   HCT 40.2 46.2    308   GRANS  --  95*   LYMPH  --  2*   EOS  --  0      Cardiac Enzymes Recent Labs     02/13/20  0945   CPK 82   CKND1 CALCULATION NOT PERFORMED WHEN RESULT IS BELOW LINEAR LIMIT      Coagulation No results for input(s): PTP, INR, APTT, INREXT in the last 72 hours. Lipid Panel No results found for: CHOL, CHOLPOCT, CHOLX, CHLST, CHOLV, [id number], HDL, HDLP, LDL, LDLC, DLDLP, 000653, VLDLC, VLDL, TGLX, TRIGL, TRIGP, TGLPOCT, CHHD, CHHDX   BNP No results for input(s): BNPP in the last 72 hours.    Liver Enzymes Recent Labs     02/13/20  0945   TP 7.7   ALB 4.4   AP 72   SGOT 16      Thyroid Studies No results found for: T4, T3U, TSH, TSHEXT     Procedures/imaging: see electronic medical records for all procedures/Xrays and details which were not copied into this note but were reviewed prior to creation of Javier Gordillo MD

## 2020-02-14 NOTE — CONSULTS
[id number] [facility]    Name:  Lena Cross  MR#:   [id number]  :  1965  ACCOUNT #:  [de-identified]  DATE OF SERVICE:  2020      HISTORY OF PRESENT ILLNESS:  The patient is a gentleman who presented to the emergency room with nausea, vomiting, abdominal distension. I was consulted by the Emergency Department because of the patient's gastric distension and abdominal distension. I suggested possibly NG tube and also Reglan. The patient is admitted for probable gastroenteritis. His abdomen is benign this morning. He is not nauseated or vomiting and feels much better. Nasogastric tube was not inserted. The patient will be treated by the Hospitalist Service with conservative treatment. He does not need surgery. I will sign off for this patient. If he has any other issues or need for General Surgery, please reconsult.       MD MAXINE [email or web address]  D:  2020 8:48  T:  2020 10:37  JOB #:  [id number]

## 2020-02-14 NOTE — PROGRESS NOTES
Bedside and Verbal shift change report given to Mónica Chester RN (oncoming nurse) by Nida Tabares RN (offgoing nurse). Report included the following information SBAR, Kardex, ED Summary, Intake/Output, MAR, Accordion and Recent Results. Stool sample sent. Patient has some edema on hands and bilateral LE, notified Dr. Malcolm Haddad; orders for lasix and reduce fluid rate. Patient has had an uneventful shift. No requested for pain or nausea medication. Bedside and Verbal shift change report given to NESSA Connolly RN (oncoming nurse) by Mónica Chester RN (offgoing nurse). Report included the following information SBAR, Kardex, ED Summary, Procedure Summary, Intake/Output, MAR and Recent Results.

## 2020-02-15 VITALS
TEMPERATURE: 98.7 F | BODY MASS INDEX: 40.81 KG/M2 | SYSTOLIC BLOOD PRESSURE: 110 MMHG | OXYGEN SATURATION: 97 % | RESPIRATION RATE: 16 BRPM | WEIGHT: 269.3 LBS | HEIGHT: 68 IN | HEART RATE: 59 BPM | DIASTOLIC BLOOD PRESSURE: 73 MMHG

## 2020-02-15 LAB
ANION GAP SERPL CALC-SCNC: 6 MMOL/L (ref 3–18)
BACTERIA SPEC CULT: NORMAL
BASOPHILS # BLD: 0 K/UL (ref 0–0.1)
BASOPHILS NFR BLD: 0 % (ref 0–2)
BUN SERPL-MCNC: 10 MG/DL (ref 7–18)
BUN/CREAT SERPL: 10 (ref 12–20)
CALCIUM SERPL-MCNC: 8.3 MG/DL (ref 8.5–10.1)
CAMPYLOBACTER SPECIES, DNA: NEGATIVE
CHLORIDE SERPL-SCNC: 111 MMOL/L (ref 100–111)
CO2 SERPL-SCNC: 25 MMOL/L (ref 21–32)
CREAT SERPL-MCNC: 0.98 MG/DL (ref 0.6–1.3)
DIFFERENTIAL METHOD BLD: ABNORMAL
ENTEROTOXIGEN E COLI, DNA: NEGATIVE
EOSINOPHIL # BLD: 0.2 K/UL (ref 0–0.4)
EOSINOPHIL NFR BLD: 3 % (ref 0–5)
ERYTHROCYTE [DISTWIDTH] IN BLOOD BY AUTOMATED COUNT: 12.3 % (ref 11.6–14.5)
GLUCOSE SERPL-MCNC: 97 MG/DL (ref 74–99)
HCT VFR BLD AUTO: 37.5 % (ref 36–48)
HGB BLD-MCNC: 12.6 G/DL (ref 13–16)
LYMPHOCYTES # BLD: 1.6 K/UL (ref 0.9–3.6)
LYMPHOCYTES NFR BLD: 26 % (ref 21–52)
MCH RBC QN AUTO: 29.8 PG (ref 24–34)
MCHC RBC AUTO-ENTMCNC: 33.6 G/DL (ref 31–37)
MCV RBC AUTO: 88.7 FL (ref 74–97)
MONOCYTES # BLD: 0.8 K/UL (ref 0.05–1.2)
MONOCYTES NFR BLD: 13 % (ref 3–10)
NEUTS SEG # BLD: 3.7 K/UL (ref 1.8–8)
NEUTS SEG NFR BLD: 58 % (ref 40–73)
P SHIGELLOIDES DNA STL QL NAA+PROBE: NEGATIVE
PLATELET # BLD AUTO: 247 K/UL (ref 135–420)
PMV BLD AUTO: 10.3 FL (ref 9.2–11.8)
POTASSIUM SERPL-SCNC: 3.8 MMOL/L (ref 3.5–5.5)
RBC # BLD AUTO: 4.23 M/UL (ref 4.7–5.5)
SALMONELLA SPECIES, DNA: NEGATIVE
SERVICE CMNT-IMP: NORMAL
SHIGA TOXIN PRODUCING, DNA: NEGATIVE
SHIGELLA SP+EIEC IPAH STL QL NAA+PROBE: NEGATIVE
SODIUM SERPL-SCNC: 142 MMOL/L (ref 136–145)
VIBRIO SPECIES, DNA: NEGATIVE
WBC # BLD AUTO: 6.3 K/UL (ref 4.6–13.2)
Y. ENTEROCOLITICA, DNA: NEGATIVE

## 2020-02-15 PROCEDURE — 96372 THER/PROPH/DIAG INJ SC/IM: CPT

## 2020-02-15 PROCEDURE — 36415 COLL VENOUS BLD VENIPUNCTURE: CPT

## 2020-02-15 PROCEDURE — 74011000258 HC RX REV CODE- 258: Performed by: FAMILY MEDICINE

## 2020-02-15 PROCEDURE — 74011250636 HC RX REV CODE- 250/636: Performed by: FAMILY MEDICINE

## 2020-02-15 PROCEDURE — 80048 BASIC METABOLIC PNL TOTAL CA: CPT

## 2020-02-15 PROCEDURE — 99218 HC RM OBSERVATION: CPT

## 2020-02-15 PROCEDURE — 96376 TX/PRO/DX INJ SAME DRUG ADON: CPT

## 2020-02-15 PROCEDURE — 85025 COMPLETE CBC W/AUTO DIFF WBC: CPT

## 2020-02-15 RX ADMIN — HEPARIN SODIUM 5000 UNITS: 5000 INJECTION INTRAVENOUS; SUBCUTANEOUS at 00:37

## 2020-02-15 RX ADMIN — HEPARIN SODIUM 5000 UNITS: 5000 INJECTION INTRAVENOUS; SUBCUTANEOUS at 10:05

## 2020-02-15 RX ADMIN — PIPERACILLIN AND TAZOBACTAM 3.38 G: 3; .375 INJECTION, POWDER, LYOPHILIZED, FOR SOLUTION INTRAVENOUS at 00:36

## 2020-02-15 RX ADMIN — PIPERACILLIN AND TAZOBACTAM 3.38 G: 3; .375 INJECTION, POWDER, LYOPHILIZED, FOR SOLUTION INTRAVENOUS at 10:06

## 2020-02-15 NOTE — PROGRESS NOTES
1945  Bedside and Verbal shift change report given by Mary Keen (off going nurse) to Corwin Vaughn RN (on coming). Report included the following information SBAR, Kardex, OR Summary, Intake/Output and MAR.     0725  Bedside and Verbal shift change report given to [facility] (on coming nurse) by Corwin Vaughn RN (off going). Report included the following information SBAR, Kardex, OR Summary, Intake/Output and MAR.

## 2020-02-15 NOTE — PROGRESS NOTES
0720- Bedside and Verbal shift change report given to [address] (oncoming nurse) by Shivani Mcwilliams RN (offgoing nurse). Report included the following information SBAR, Kardex and MAR.     4823- Dr. Vasquez File paged to inform her that patient has a made a request to speak with her. Awaiting call back. 1600- Patient advanced to a regular diet, per provider. Patient tolerated meal. No reports of nausea, vomiting or diarrhea. Discharge orders placed for patient pending toleration of meal.    1800 Discharge instructions reviewed with patient. Patient verbally expressed his understanding of instructions, follow up and medications. 200- Patient discharged to home.

## 2020-02-19 LAB
BACTERIA SPEC CULT: NORMAL
BACTERIA SPEC CULT: NORMAL
SERVICE CMNT-IMP: NORMAL
SERVICE CMNT-IMP: NORMAL

## 2021-04-01 ENCOUNTER — HOSPITAL ENCOUNTER (OUTPATIENT)
Dept: PREADMISSION TESTING | Age: 56
Discharge: HOME | End: 2021-04-01
Payer: OTHER GOVERNMENT

## 2021-04-01 PROCEDURE — U0003 INFECTIOUS AGENT DETECTION BY NUCLEIC ACID (DNA OR RNA); SEVERE ACUTE RESPIRATORY SYNDROME CORONAVIRUS 2 (SARS-COV-2) (CORONAVIRUS DISEASE [COVID-19]), AMPLIFIED PROBE TECHNIQUE, MAKING USE OF HIGH THROUGHPUT TECHNOLOGIES AS DESCRIBED BY CMS-2020-01-R: HCPCS

## 2021-04-02 LAB — SARS-COV-2, COV2NT: NOT DETECTED

## 2021-04-06 ENCOUNTER — ANESTHESIA EVENT (OUTPATIENT)
Dept: SURGERY | Age: 56
DRG: 419 | End: 2021-04-06
Payer: OTHER GOVERNMENT

## 2021-04-07 ENCOUNTER — HOSPITAL ENCOUNTER (INPATIENT)
Age: 56
LOS: 1 days | Discharge: HOME | DRG: 419 | End: 2021-04-07
Attending: SURGERY | Admitting: SURGERY
Payer: OTHER GOVERNMENT

## 2021-04-07 ENCOUNTER — ANESTHESIA (OUTPATIENT)
Dept: SURGERY | Age: 56
DRG: 419 | End: 2021-04-07
Payer: OTHER GOVERNMENT

## 2021-04-07 VITALS
SYSTOLIC BLOOD PRESSURE: 129 MMHG | HEIGHT: 68 IN | RESPIRATION RATE: 16 BRPM | WEIGHT: 260.38 LBS | HEART RATE: 53 BPM | OXYGEN SATURATION: 95 % | DIASTOLIC BLOOD PRESSURE: 70 MMHG | BODY MASS INDEX: 39.46 KG/M2 | TEMPERATURE: 97.9 F

## 2021-04-07 DIAGNOSIS — K82.4 GALLBLADDER POLYP: Primary | ICD-10-CM

## 2021-04-07 PROBLEM — Z90.49 S/P LAPAROSCOPIC CHOLECYSTECTOMY: Status: ACTIVE | Noted: 2021-04-07

## 2021-04-07 PROCEDURE — 65270000029 HC RM PRIVATE

## 2021-04-07 PROCEDURE — 77030031139 HC SUT VCRL2 J&J -A: Performed by: SURGERY

## 2021-04-07 PROCEDURE — 74011250636 HC RX REV CODE- 250/636: Performed by: NURSE ANESTHETIST, CERTIFIED REGISTERED

## 2021-04-07 PROCEDURE — 77030020782 HC GWN BAIR PAWS FLX 3M -B: Performed by: SURGERY

## 2021-04-07 PROCEDURE — 77030040361 HC SLV COMPR DVT MDII -B: Performed by: SURGERY

## 2021-04-07 PROCEDURE — 74011000250 HC RX REV CODE- 250: Performed by: SURGERY

## 2021-04-07 PROCEDURE — 74011250636 HC RX REV CODE- 250/636: Performed by: SURGERY

## 2021-04-07 PROCEDURE — 76060000032 HC ANESTHESIA 0.5 TO 1 HR: Performed by: SURGERY

## 2021-04-07 PROCEDURE — 77030016151 HC PROTCTR LNS DFOG COVD -B: Performed by: SURGERY

## 2021-04-07 PROCEDURE — 76010000138 HC OR TIME 0.5 TO 1 HR: Performed by: SURGERY

## 2021-04-07 PROCEDURE — 0FB04ZX EXCISION OF LIVER, PERCUTANEOUS ENDOSCOPIC APPROACH, DIAGNOSTIC: ICD-10-PCS | Performed by: SURGERY

## 2021-04-07 PROCEDURE — 76210000026 HC REC RM PH II 1 TO 1.5 HR: Performed by: SURGERY

## 2021-04-07 PROCEDURE — 74011250636 HC RX REV CODE- 250/636: Performed by: SPECIALIST

## 2021-04-07 PROCEDURE — 77030002933 HC SUT MCRYL J&J -A: Performed by: SURGERY

## 2021-04-07 PROCEDURE — 88313 SPECIAL STAINS GROUP 2: CPT

## 2021-04-07 PROCEDURE — 74011000250 HC RX REV CODE- 250: Performed by: NURSE ANESTHETIST, CERTIFIED REGISTERED

## 2021-04-07 PROCEDURE — 88304 TISSUE EXAM BY PATHOLOGIST: CPT

## 2021-04-07 PROCEDURE — 2709999900 HC NON-CHARGEABLE SUPPLY: Performed by: SURGERY

## 2021-04-07 PROCEDURE — 77030006643: Performed by: SPECIALIST

## 2021-04-07 PROCEDURE — 77030008683 HC TU ET CUF COVD -A: Performed by: SPECIALIST

## 2021-04-07 PROCEDURE — 77030010507 HC ADH SKN DERMBND J&J -B: Performed by: SURGERY

## 2021-04-07 PROCEDURE — 77030012770 HC TRCR OPT FX AMR -B: Performed by: SURGERY

## 2021-04-07 PROCEDURE — 77030010031 HC SCIS ENDOSC MPLR J&J -C: Performed by: SURGERY

## 2021-04-07 PROCEDURE — 76210000016 HC OR PH I REC 1 TO 1.5 HR: Performed by: SURGERY

## 2021-04-07 PROCEDURE — 77030008608 HC TRCR ENDOSC SMTH AMR -B: Performed by: SURGERY

## 2021-04-07 PROCEDURE — 77030018875 HC APPL CLP LIG4 J&J -B: Performed by: SURGERY

## 2021-04-07 PROCEDURE — 0FT44ZZ RESECTION OF GALLBLADDER, PERCUTANEOUS ENDOSCOPIC APPROACH: ICD-10-PCS | Performed by: SURGERY

## 2021-04-07 PROCEDURE — 88307 TISSUE EXAM BY PATHOLOGIST: CPT

## 2021-04-07 PROCEDURE — 77030008518 HC TBNG INSUF ENDO STRY -B: Performed by: SURGERY

## 2021-04-07 PROCEDURE — 77030008574 HC TBNG SUC IRR STRY -B: Performed by: SURGERY

## 2021-04-07 PROCEDURE — 74011250637 HC RX REV CODE- 250/637: Performed by: SPECIALIST

## 2021-04-07 PROCEDURE — 77030008477 HC STYL SATN SLP COVD -A: Performed by: SPECIALIST

## 2021-04-07 PROCEDURE — 77030003578 HC NDL INSUF VERES AMR -B: Performed by: SURGERY

## 2021-04-07 PROCEDURE — 77030020829: Performed by: SURGERY

## 2021-04-07 PROCEDURE — 77030009403 HC ELECTRD ENDO MEGA -B: Performed by: SURGERY

## 2021-04-07 PROCEDURE — 77030009851 HC PCH RTVR ENDOSC AMR -B: Performed by: SURGERY

## 2021-04-07 RX ORDER — MULTIVIT WITH MINERALS/HERBS
1 TABLET ORAL DAILY
COMMUNITY

## 2021-04-07 RX ORDER — BUPIVACAINE HYDROCHLORIDE 2.5 MG/ML
INJECTION, SOLUTION EPIDURAL; INFILTRATION; INTRACAUDAL AS NEEDED
Status: DISCONTINUED | OUTPATIENT
Start: 2021-04-07 | End: 2021-04-07 | Stop reason: HOSPADM

## 2021-04-07 RX ORDER — ONDANSETRON 2 MG/ML
4 INJECTION INTRAMUSCULAR; INTRAVENOUS ONCE
Status: DISCONTINUED | OUTPATIENT
Start: 2021-04-07 | End: 2021-04-07 | Stop reason: HOSPADM

## 2021-04-07 RX ORDER — MIDAZOLAM HYDROCHLORIDE 1 MG/ML
INJECTION, SOLUTION INTRAMUSCULAR; INTRAVENOUS AS NEEDED
Status: DISCONTINUED | OUTPATIENT
Start: 2021-04-07 | End: 2021-04-07 | Stop reason: HOSPADM

## 2021-04-07 RX ORDER — NALOXONE HYDROCHLORIDE 0.4 MG/ML
0.1 INJECTION, SOLUTION INTRAMUSCULAR; INTRAVENOUS; SUBCUTANEOUS AS NEEDED
Status: DISCONTINUED | OUTPATIENT
Start: 2021-04-07 | End: 2021-04-07 | Stop reason: HOSPADM

## 2021-04-07 RX ORDER — FENTANYL CITRATE 50 UG/ML
25 INJECTION, SOLUTION INTRAMUSCULAR; INTRAVENOUS AS NEEDED
Status: DISCONTINUED | OUTPATIENT
Start: 2021-04-07 | End: 2021-04-07 | Stop reason: HOSPADM

## 2021-04-07 RX ORDER — HYDROMORPHONE HYDROCHLORIDE 2 MG/ML
INJECTION, SOLUTION INTRAMUSCULAR; INTRAVENOUS; SUBCUTANEOUS AS NEEDED
Status: DISCONTINUED | OUTPATIENT
Start: 2021-04-07 | End: 2021-04-07 | Stop reason: HOSPADM

## 2021-04-07 RX ORDER — GLYCOPYRROLATE 0.2 MG/ML
INJECTION INTRAMUSCULAR; INTRAVENOUS AS NEEDED
Status: DISCONTINUED | OUTPATIENT
Start: 2021-04-07 | End: 2021-04-07 | Stop reason: HOSPADM

## 2021-04-07 RX ORDER — DEXAMETHASONE SODIUM PHOSPHATE 4 MG/ML
4 INJECTION, SOLUTION INTRA-ARTICULAR; INTRALESIONAL; INTRAMUSCULAR; INTRAVENOUS; SOFT TISSUE ONCE
Status: COMPLETED | OUTPATIENT
Start: 2021-04-07 | End: 2021-04-07

## 2021-04-07 RX ORDER — PROPOFOL 10 MG/ML
INJECTION, EMULSION INTRAVENOUS AS NEEDED
Status: DISCONTINUED | OUTPATIENT
Start: 2021-04-07 | End: 2021-04-07 | Stop reason: HOSPADM

## 2021-04-07 RX ORDER — KETOROLAC TROMETHAMINE 15 MG/ML
INJECTION, SOLUTION INTRAMUSCULAR; INTRAVENOUS AS NEEDED
Status: DISCONTINUED | OUTPATIENT
Start: 2021-04-07 | End: 2021-04-07 | Stop reason: HOSPADM

## 2021-04-07 RX ORDER — FENTANYL CITRATE 50 UG/ML
INJECTION, SOLUTION INTRAMUSCULAR; INTRAVENOUS AS NEEDED
Status: DISCONTINUED | OUTPATIENT
Start: 2021-04-07 | End: 2021-04-07 | Stop reason: HOSPADM

## 2021-04-07 RX ORDER — KETOROLAC TROMETHAMINE 30 MG/ML
30 INJECTION, SOLUTION INTRAMUSCULAR; INTRAVENOUS
Status: DISCONTINUED | OUTPATIENT
Start: 2021-04-07 | End: 2021-04-07 | Stop reason: HOSPADM

## 2021-04-07 RX ORDER — VITAMIN E 268 MG
400 CAPSULE ORAL DAILY
COMMUNITY

## 2021-04-07 RX ORDER — SUCCINYLCHOLINE CHLORIDE 100 MG/5ML
SYRINGE (ML) INTRAVENOUS AS NEEDED
Status: DISCONTINUED | OUTPATIENT
Start: 2021-04-07 | End: 2021-04-07 | Stop reason: HOSPADM

## 2021-04-07 RX ORDER — HYDROMORPHONE HYDROCHLORIDE 1 MG/ML
0.2 INJECTION, SOLUTION INTRAMUSCULAR; INTRAVENOUS; SUBCUTANEOUS
Status: DISCONTINUED | OUTPATIENT
Start: 2021-04-07 | End: 2021-04-07 | Stop reason: HOSPADM

## 2021-04-07 RX ORDER — SODIUM CHLORIDE, SODIUM LACTATE, POTASSIUM CHLORIDE, CALCIUM CHLORIDE 600; 310; 30; 20 MG/100ML; MG/100ML; MG/100ML; MG/100ML
125 INJECTION, SOLUTION INTRAVENOUS CONTINUOUS
Status: DISCONTINUED | OUTPATIENT
Start: 2021-04-07 | End: 2021-04-07 | Stop reason: HOSPADM

## 2021-04-07 RX ORDER — LIDOCAINE HYDROCHLORIDE 20 MG/ML
INJECTION, SOLUTION EPIDURAL; INFILTRATION; INTRACAUDAL; PERINEURAL AS NEEDED
Status: DISCONTINUED | OUTPATIENT
Start: 2021-04-07 | End: 2021-04-07 | Stop reason: HOSPADM

## 2021-04-07 RX ORDER — GABAPENTIN 300 MG/1
300 CAPSULE ORAL ONCE
Status: COMPLETED | OUTPATIENT
Start: 2021-04-07 | End: 2021-04-07

## 2021-04-07 RX ORDER — DEXTROSE MONOHYDRATE 100 MG/ML
125-250 INJECTION, SOLUTION INTRAVENOUS AS NEEDED
Status: DISCONTINUED | OUTPATIENT
Start: 2021-04-07 | End: 2021-04-07 | Stop reason: HOSPADM

## 2021-04-07 RX ORDER — CHOLECALCIFEROL TAB 125 MCG (5000 UNIT) 125 MCG
5000 TAB ORAL DAILY
COMMUNITY

## 2021-04-07 RX ORDER — MAGNESIUM SULFATE 100 %
4 CRYSTALS MISCELLANEOUS AS NEEDED
Status: DISCONTINUED | OUTPATIENT
Start: 2021-04-07 | End: 2021-04-07 | Stop reason: HOSPADM

## 2021-04-07 RX ORDER — SCOLOPAMINE TRANSDERMAL SYSTEM 1 MG/1
1 PATCH, EXTENDED RELEASE TRANSDERMAL ONCE
Status: DISCONTINUED | OUTPATIENT
Start: 2021-04-07 | End: 2021-04-07 | Stop reason: HOSPADM

## 2021-04-07 RX ORDER — CELECOXIB 100 MG/1
200 CAPSULE ORAL ONCE
Status: COMPLETED | OUTPATIENT
Start: 2021-04-07 | End: 2021-04-07

## 2021-04-07 RX ORDER — FENTANYL CITRATE 50 UG/ML
50 INJECTION, SOLUTION INTRAMUSCULAR; INTRAVENOUS
Status: DISCONTINUED | OUTPATIENT
Start: 2021-04-07 | End: 2021-04-07 | Stop reason: HOSPADM

## 2021-04-07 RX ORDER — OXYCODONE AND ACETAMINOPHEN 5; 325 MG/1; MG/1
1 TABLET ORAL
Qty: 20 TAB | Refills: 0 | Status: SHIPPED | OUTPATIENT
Start: 2021-04-07 | End: 2021-04-12

## 2021-04-07 RX ORDER — FAMOTIDINE 10 MG/ML
20 INJECTION INTRAVENOUS ONCE
Status: COMPLETED | OUTPATIENT
Start: 2021-04-07 | End: 2021-04-07

## 2021-04-07 RX ORDER — OXYCODONE AND ACETAMINOPHEN 5; 325 MG/1; MG/1
1 TABLET ORAL AS NEEDED
Status: DISCONTINUED | OUTPATIENT
Start: 2021-04-07 | End: 2021-04-07 | Stop reason: HOSPADM

## 2021-04-07 RX ORDER — SODIUM CHLORIDE 9 MG/ML
125 INJECTION, SOLUTION INTRAVENOUS CONTINUOUS
Status: DISCONTINUED | OUTPATIENT
Start: 2021-04-07 | End: 2021-04-07 | Stop reason: HOSPADM

## 2021-04-07 RX ORDER — KETAMINE HYDROCHLORIDE 10 MG/ML
INJECTION, SOLUTION INTRAMUSCULAR; INTRAVENOUS AS NEEDED
Status: DISCONTINUED | OUTPATIENT
Start: 2021-04-07 | End: 2021-04-07 | Stop reason: HOSPADM

## 2021-04-07 RX ORDER — MECLIZINE HCL 12.5 MG 12.5 MG/1
25 TABLET ORAL ONCE
Status: COMPLETED | OUTPATIENT
Start: 2021-04-07 | End: 2021-04-07

## 2021-04-07 RX ORDER — ONDANSETRON 4 MG/1
4 TABLET, ORALLY DISINTEGRATING ORAL ONCE
Status: COMPLETED | OUTPATIENT
Start: 2021-04-07 | End: 2021-04-07

## 2021-04-07 RX ORDER — ROCURONIUM BROMIDE 10 MG/ML
INJECTION, SOLUTION INTRAVENOUS AS NEEDED
Status: DISCONTINUED | OUTPATIENT
Start: 2021-04-07 | End: 2021-04-07 | Stop reason: HOSPADM

## 2021-04-07 RX ORDER — ACETAMINOPHEN 500 MG
1000 TABLET ORAL ONCE
Status: DISCONTINUED | OUTPATIENT
Start: 2021-04-07 | End: 2021-04-07 | Stop reason: HOSPADM

## 2021-04-07 RX ORDER — LANOLIN ALCOHOL/MO/W.PET/CERES
400 CREAM (GRAM) TOPICAL DAILY
COMMUNITY

## 2021-04-07 RX ORDER — CHOLECALCIFEROL (VITAMIN D3) 125 MCG
100 CAPSULE ORAL DAILY
COMMUNITY

## 2021-04-07 RX ORDER — HYDROMORPHONE HYDROCHLORIDE 1 MG/ML
INJECTION, SOLUTION INTRAMUSCULAR; INTRAVENOUS; SUBCUTANEOUS
Status: DISCONTINUED
Start: 2021-04-07 | End: 2021-04-07 | Stop reason: HOSPADM

## 2021-04-07 RX ORDER — FISH OIL/DHA/EPA 1200-144MG
1 CAPSULE ORAL DAILY
COMMUNITY

## 2021-04-07 RX ADMIN — CELECOXIB 200 MG: 100 CAPSULE ORAL at 10:55

## 2021-04-07 RX ADMIN — CEFOXITIN SODIUM 2 G: 2 POWDER, FOR SOLUTION INTRAVENOUS at 11:28

## 2021-04-07 RX ADMIN — PROPOFOL 200 MG: 10 INJECTION, EMULSION INTRAVENOUS at 11:28

## 2021-04-07 RX ADMIN — MIDAZOLAM 2 MG: 1 INJECTION INTRAMUSCULAR; INTRAVENOUS at 11:20

## 2021-04-07 RX ADMIN — FENTANYL CITRATE 50 MCG: 50 INJECTION, SOLUTION INTRAMUSCULAR; INTRAVENOUS at 12:03

## 2021-04-07 RX ADMIN — ROCURONIUM BROMIDE 35 MG: 10 INJECTION, SOLUTION INTRAVENOUS at 11:37

## 2021-04-07 RX ADMIN — FENTANYL CITRATE 50 MCG: 50 INJECTION, SOLUTION INTRAMUSCULAR; INTRAVENOUS at 12:27

## 2021-04-07 RX ADMIN — Medication 160 MG: at 11:28

## 2021-04-07 RX ADMIN — LIDOCAINE HYDROCHLORIDE 80 MG: 20 INJECTION, SOLUTION EPIDURAL; INFILTRATION; INTRACAUDAL; PERINEURAL at 11:28

## 2021-04-07 RX ADMIN — DEXAMETHASONE SODIUM PHOSPHATE 4 MG: 4 INJECTION, SOLUTION INTRAMUSCULAR; INTRAVENOUS at 10:51

## 2021-04-07 RX ADMIN — HYDROMORPHONE HYDROCHLORIDE 0.2 MG: 1 INJECTION, SOLUTION INTRAMUSCULAR; INTRAVENOUS; SUBCUTANEOUS at 13:24

## 2021-04-07 RX ADMIN — SODIUM CHLORIDE, SODIUM LACTATE, POTASSIUM CHLORIDE, AND CALCIUM CHLORIDE 1000 ML: 600; 310; 30; 20 INJECTION, SOLUTION INTRAVENOUS at 10:43

## 2021-04-07 RX ADMIN — FENTANYL CITRATE 50 MCG: 50 INJECTION, SOLUTION INTRAMUSCULAR; INTRAVENOUS at 12:52

## 2021-04-07 RX ADMIN — KETOROLAC TROMETHAMINE 15 MG: 15 INJECTION, SOLUTION INTRAMUSCULAR; INTRAVENOUS at 12:02

## 2021-04-07 RX ADMIN — KETAMINE HYDROCHLORIDE 20 MG: 10 INJECTION, SOLUTION INTRAMUSCULAR; INTRAVENOUS at 11:28

## 2021-04-07 RX ADMIN — FENTANYL CITRATE 50 MCG: 50 INJECTION, SOLUTION INTRAMUSCULAR; INTRAVENOUS at 12:04

## 2021-04-07 RX ADMIN — KETAMINE HYDROCHLORIDE 30 MG: 10 INJECTION, SOLUTION INTRAMUSCULAR; INTRAVENOUS at 11:25

## 2021-04-07 RX ADMIN — ONDANSETRON 4 MG: 4 TABLET, ORALLY DISINTEGRATING ORAL at 10:55

## 2021-04-07 RX ADMIN — SUGAMMADEX 200 MG: 100 INJECTION, SOLUTION INTRAVENOUS at 12:05

## 2021-04-07 RX ADMIN — OXYCODONE HYDROCHLORIDE AND ACETAMINOPHEN 1 TABLET: 5; 325 TABLET ORAL at 14:14

## 2021-04-07 RX ADMIN — MECLIZINE 25 MG: 12.5 TABLET ORAL at 10:50

## 2021-04-07 RX ADMIN — FAMOTIDINE 20 MG: 10 INJECTION INTRAVENOUS at 10:50

## 2021-04-07 RX ADMIN — FENTANYL CITRATE 100 MCG: 50 INJECTION, SOLUTION INTRAMUSCULAR; INTRAVENOUS at 11:20

## 2021-04-07 RX ADMIN — SODIUM CHLORIDE, SODIUM LACTATE, POTASSIUM CHLORIDE, AND CALCIUM CHLORIDE 125 ML/HR: 600; 310; 30; 20 INJECTION, SOLUTION INTRAVENOUS at 10:40

## 2021-04-07 RX ADMIN — SODIUM CHLORIDE, SODIUM LACTATE, POTASSIUM CHLORIDE, AND CALCIUM CHLORIDE 125 ML/HR: 600; 310; 30; 20 INJECTION, SOLUTION INTRAVENOUS at 12:37

## 2021-04-07 RX ADMIN — ROCURONIUM BROMIDE 5 MG: 10 INJECTION, SOLUTION INTRAVENOUS at 11:28

## 2021-04-07 RX ADMIN — GLYCOPYRROLATE 0.2 MG: 0.2 INJECTION INTRAMUSCULAR; INTRAVENOUS at 11:20

## 2021-04-07 RX ADMIN — HYDROMORPHONE HYDROCHLORIDE 1 MG: 2 INJECTION, SOLUTION INTRAMUSCULAR; INTRAVENOUS; SUBCUTANEOUS at 12:29

## 2021-04-07 RX ADMIN — GABAPENTIN 300 MG: 300 CAPSULE ORAL at 10:55

## 2021-04-07 NOTE — PERIOP NOTES
Reviewed discharge plan of care with patient in person and with his wife over the phone. Written instructions provided as well.  They verbalized understanding

## 2021-04-07 NOTE — H&P
Assessment/Plan  # Detail Type Description    1. Assessment Gallbladder polyp (K82.4). Patient Plan Recommend lap kareen with liver biopsy. I have discussed the risks, benefits and alternatives of the procedure to the patient including bleeding, infection, DVT, CVA, MI, death, injury to the liver and bile ducts, bile leak and possible open procedure. They understand and wish to proceed. 2. Assessment Fatty liver (K76.0). 3. Assessment Mixed hyperlipidemia (E78.2). 4. Assessment Body mass index (BMI) 39.0-39.9, adult (Z68.39). Plan Orders Today's instructions / counseling include(s) Dietary management education, guidance, and counseling. This 54year old male presents for Gallbladder Disease. History of Present Illness  1. Gallbladder Disease   Location is RUQ. There is radiation to back. The patient describes it as colicky. Context includes after meals. Identified risk factors include obesity. The patient is also experiencing back pain and bloating. Pertinent negatives include anorexia, blood in stool, change in appetite, chills, constipation, diaphoresis, diarrhea, dizziness, dyspnea, early satiety, eructation, fatigue, fever, flank pain, flatulence, flushing, heartburn, hematuria, jaundice, lightheadedness, menstruation, milk/dairy intolerance, myalgia, nausea, postprandial fullness, reflux, vomiting, weight gain and weight loss. Additional information: History of gallbladder polyps. Previous polyp larger and now has new polyp. Hx MIRANDA and fatty liver. .            Problem List  Problem List reviewed.    Problem Description Onset Date Chronic Clinical Status Notes   Pneumonia  Y     Hemorrhoids  Y     Fatty liver  Y         Past Medical/Surgical History   (Detailed)  Disease/disorder Onset Date Management Date Comments     Corrective eye surgery (Mar 86) 2002      Vasectomy 1998    calcified tumor (upper back)  surgical removal 1992    gallbladder polyps hepatic steatosis           Family History   (Detailed)    Relationship Family Member Name  Age at Death Condition Onset Age Cause of Death   Maternal grandfather    Cancer, unknown  Y   Maternal grandmother    Diabetes mellitus  N   Maternal grandmother    Hypertension  N     Social History  (Detailed)  Preferred language is English. Education/Employment/Occupation  Employment History Status Retired Restrictions   [employer]  full-time       Marital Status/Family/Social Support  Marital status:      Tobacco use status: Current non-smoker. Smoking status: Never smoker. Medications (active prior to today)  Medication Instructions Start Date Stop Date Refilled Elsewhere   Vitamin C 1,000 mg tablet take 1 tablet daily 2020   N   Vitamin D3 5,000 unit tablet take 1 by Oral route once 2020   N   L-Glutamine 500 mg tablet take 2 daily 2020   N   chromium picolinate 400 mcg tablet  2020  N   vitamin E 200 unit capsule  2020   N   selenium 200 mcg capsule  2020   N   CoQ-10 100 mg capsule  2020   N   Odorless Garlic 706 mg capsule   2021  N   ginkgo biloba 120 mg tablet  2020  N   Glucosamine Chondroitin 550 mg-30 mg-1 mg capsule  2020   N   glucosamine HCl 1,500 mg tablet  2020   N   magnesium 200 mg tablet  2020   N       Medication Reconciliation  Medications reconciled today.     Medication Reviewed  Adherence Medication Name Sig Desc Elsewhere Status   taking as directed Vitamin C 1,000 mg tablet take 1 tablet daily N Verified   taking as directed magnesium 200 mg tablet  N Verified   taking as directed selenium 200 mcg capsule  N Verified   taking as directed vitamin E 200 unit capsule  N Verified   taking as directed glucosamine HCl 1,500 mg tablet  N Verified   taking as directed Vitamin D3 5,000 unit tablet take 1 by Oral route once N Verified   taking as directed CoQ-10 100 mg capsule  N Verified   taking as directed L-Glutamine 500 mg tablet take 2 daily N Verified   taking as directed Glucosamine Chondroitin 550 mg-30 mg-1 mg capsule  N Verified     Allergies  Ingredient Reaction (Severity) Medication Name Comment   NO KNOWN ALLERGIES        Reviewed, no changes. Review of Systems  System Neg/Pos Details   Constitutional Negative Chills, Fatigue, Fever, Flushing, Night sweats, Weight gain and Weight loss. ENMT Positive Ringing in ears. ENMT Negative Hearing loss, Tinnitus, Vertigo and Voice change. Eyes Negative Diplopia and Vision loss. Respiratory Negative Asthma, Cough, Dyspnea, Hemoptysis, Known TB exposure and Wheezing. Cardio Positive Fainting/syncope. Cardio Negative Chest pain, Claudication, Edema, Irregular heartbeat/palpitations and Thrombophlebitis. GI Positive Bloating, Bloating, hemorrhoids. GI Negative Anorexia, Blood in stool, Change in appetite, Constipation, Diarrhea, Dysphagia, Early satiety, Eructation, Flatulence, Heartburn, Hemorrhoids, Jaundice, Milk/diary intolerance, Nausea, Post prandial fullness, Reflux and Vomiting.  Negative Dysuria, Flank pain, Hematuria, Menstruation, Nocturia, Passage stone/gravel and Urinary incontinence. Endocrine Negative Cold intolerance, Diaphoresis and Goiter. Neuro Negative Dizziness, Focal weakness, Headache, Lightheadedness, Paresthesia, Seizures and Syncope. Integumentary Negative Change in shape/size of mole(s) and Skin lesion. MS Positive Back pain, Joint pain. MS Negative Bone/joint symptoms, Muscle weakness and Myalgia. Hema/Lymph Negative Easy bleeding and Easy bruising. Allergic/Immuno Negative Contact allergy and Contact dermatitis.        Vital Signs   Height  Time ft in cm Last Measured Height Position   11:56 AM 5.0 8.25 173.35 03/02/2021 Standing     Weight/BSA/BMI  Time lb oz kg Context BMI kg/m2 BSA m2   11:56 .40  118.115 dressed with shoes 39.31      Blood Pressure  Time BP mm/Hg Position Side Site Method Cuff Size   11:56 /80 sitting left brachial manual adult     Temperature/Pulse/Respiration  Time Temp F Temp C Temp Site Pulse/min Pattern Resp/ min   11:56 AM 97.50 36.39 temporal 66 regular      Pulse Oximetry/FIO2  Time Pulse Ox (Rest %) Pulse Ox (Amb %) O2 Sat O2 L/Min Timing FiO2 % L/min Delivery Method Finger Probe   11:56 AM 97  RA      R Index     Pain Scale  Time Pain Score Method   11:56 AM 2/10 Numeric Pain Intensity Scale     Measured by  Time Measured by   11:56 AM Chente Pillai     Physical Exam  Exam Findings Details   Constitutional Normal Well developed. Eyes Normal Conjunctiva - Right: Normal, Left: Normal. Pupil - Right: Normal, Left: Normal.   Ears Normal Inspection - Right: Normal, Left: Normal. Hearing - Right: Normal, Left: Normal.   Neck Exam Normal Inspection - Normal. Palpation - Normal. Thyroid gland - Normal.   Respiratory Normal Inspection - Normal. Auscultation - Normal. Effort - Normal.   Cardiovascular Normal Regular rate and rhythm. No murmurs, gallops, or rubs. Vascular Normal Pulses - Dorsalis pedis: Normal. Capillary refill - Less than 2 seconds. Abdomen * Obese. Abdomen Normal Umbilicus - Normal. No abdominal tenderness. No hepatic enlargement. No spleen enlargement. No hernia. No ascites. No palpable mass. Rooney's sign - Normal.   Skin Normal Inspection - Normal.   Musculoskeletal Normal Visual overview of all four extremities is normal.   Extremity Normal No edema. Neurological Normal Memory - Normal. Cranial nerves - Cranial nerves I grossly intact, Cranial nerves II through XII grossly intact. Psychiatric Normal Orientation - Oriented to time, place, person & situation. Appropriate mood and affect. Normal insight. Normal judgment.          Medications (added, continued, or stopped this visit)  Start Date Medication Directions PRN Status PRN Reason Instruction Stop Date   01/08/2020 chromium picolinate 400 mcg tablet  N   03/02/2021 01/08/2020 CoQ-10 100 mg capsule  N      01/08/2020 ginkgo biloba 120 mg tablet  N   03/02/2021 01/08/2020 Glucosamine Chondroitin 550 mg-30 mg-1 mg capsule  N      01/08/2020 glucosamine HCl 1,500 mg tablet  N      01/08/2020 L-Glutamine 500 mg tablet take 2 daily N      01/08/2020 magnesium 200 mg tablet  N      37/46/8881 Odorless Garlic 103 mg capsule  N   03/02/2021 01/08/2020 selenium 200 mcg capsule  N      01/08/2020 Vitamin C 1,000 mg tablet take 1 tablet daily N      01/08/2020 Vitamin D3 5,000 unit tablet take 1 by Oral route once N      01/08/2020 vitamin E 200 unit capsule  N        Active Patient Care Team Members  Name Contact Agency Type Support Role Relationship Active Date Inactive Date Specialty

## 2021-04-07 NOTE — ANESTHESIA POSTPROCEDURE EVALUATION
Procedure(s):  LAPAROSCOPIC CHOLECYSTECTOMY, LIVER BIOPSY. general    Anesthesia Post Evaluation        Comments: Post-Anesthesia Evaluation and Assessment    Cardiovascular Function/Vital Signs  BP (!) 152/66   Pulse (!) 54   Temp 36.6 °C (97.9 °F)   Resp 12   Ht 5' 8\" (1.727 m)   Wt 118.1 kg (260 lb 6 oz)   SpO2 96%   BMI 39.59 kg/m²     Patient is status post Procedure(s):  LAPAROSCOPIC CHOLECYSTECTOMY, LIVER BIOPSY. Nausea/Vomiting: Controlled. Postoperative hydration reviewed and adequate. Pain:  Pain Scale 1: Numeric (0 - 10) (04/07/21 1253)  Pain Intensity 1: 8 (04/07/21 1253)   Managed. Neurological Status:   Neuro (WDL): Within Defined Limits (04/07/21 1235)   At baseline. Mental Status and Level of Consciousness: Arousable. Pulmonary Status:   O2 Device: Nasal cannula (04/07/21 1228)   Adequate oxygenation and airway patent. Complications related to anesthesia: None    Post-anesthesia assessment completed. No concerns. Patient has met all discharge requirements. Signed By: Terri Potts MD    April 7, 2021                     INITIAL Post-op Vital signs:   Vitals Value Taken Time   /66 04/07/21 1330   Temp 36.6 °C (97.9 °F) 04/07/21 1217   Pulse 68 04/07/21 1337   Resp 21 04/07/21 1337   SpO2 89 % 04/07/21 1337   Vitals shown include unvalidated device data.

## 2021-04-07 NOTE — ANESTHESIA PREPROCEDURE EVALUATION
Relevant Problems   No relevant active problems       Anesthetic History   No history of anesthetic complications  PONV          Review of Systems / Medical History  Patient summary reviewed, nursing notes reviewed and pertinent labs reviewed    Pulmonary        Sleep apnea: BiPAP      Pertinent negatives: No COPD, asthma and smoker     Neuro/Psych   Within defined limits           Cardiovascular  Within defined limits              Pertinent negatives: No hypertension, past MI and CAD       GI/Hepatic/Renal  Within defined limits         Liver disease ( fatty liver)  Pertinent negatives: No GERD and renal disease   Endo/Other        Morbid obesity  Pertinent negatives: No diabetes   Other Findings   Comments: etoh 3/ week           Physical Exam    Airway  Mallampati: IV  TM Distance: < 4 cm  Neck ROM: decreased range of motion   Mouth opening: Diminished (comment)     Cardiovascular               Dental         Pulmonary                 Abdominal         Other Findings            Anesthetic Plan    ASA: 3  Anesthesia type: general          Induction: Intravenous  Anesthetic plan and risks discussed with: Patient

## 2021-04-07 NOTE — PERIOP NOTES
Reviewed PTA medication list with patient/caregiver and patient/caregiver denies any additional medications. Patient admits to having a responsible adult care for them at home for at least 24 hours after surgery. Patient encouraged to use gown warming system and informed that using said warming gown to regulate body temperature prior to a procedure has been shown to help reduce the risks of blood clots and infection. Patient's pharmacy of choice verified and documented in PTA medication section. Dual skin assessment & fall risk band verification completed with GISSEL Galvan RN.

## 2021-04-07 NOTE — INTERVAL H&P NOTE
Update History & Physical    The Patient's History and Physical of March 7,    was reviewed with the patient and I examined the patient. There was no change. The surgical site was confirmed by the patient and me. Plan:  The risk, benefits, expected outcome, and alternative to the recommended procedure have been discussed with the patient. Patient understands and wants to proceed with the procedure.     Electronically signed by Mihir Mason MD on 4/7/2021 at 10:48 AM

## 2021-04-07 NOTE — BRIEF OP NOTE
Brief Postoperative Note    Patient: Davi Stuart  YOB: 1965  MRN: [id number]    Date of Procedure: 4/7/2021     Pre-Op Diagnosis: GALLBLADDER POLYPS    Post-Op Diagnosis: Same as preoperative diagnosis. Procedure(s):  LAPAROSCOPIC CHOLECYSTECTOMY, LIVER BIOPSY    Surgeon(s):   Samaria Jamil MD    Surgical Assistant: Surg Asst-1: Iram Atkinson    Anesthesia: General     Estimated Blood Loss (mL): Minimal    Complications: None    Specimens:   ID Type Source Tests Collected by Time Destination   1 : GALLBLADDER AND CONTENTS Preservative Gallbladder  Samaria Jamil MD 4/7/2021 1145 Pathology   2 : [facility] LIVER BIOPSY Preservative Liver  Samaria Jamil MD 4/7/2021 1151 Pathology        Implants: * No implants in log *    Drains: * No LDAs found *    Findings: none    Electronically Signed by Geetha Vega MD on 4/7/2021 at 12:02 PM

## 2021-04-07 NOTE — DISCHARGE INSTRUCTIONS
Post-Operative Discharge Instructions  Mary Rossi. Wallace Toribio M.D.  [address] [place] [address]  [phone]    Patient: Emerson Hernández MRN: [id number]  CSN: [id number]    YOB: 1965  Age: 54 y.o. Sex: male    DOA: 2021 LOS:  LOS: 0 days   Discharge Date:      Acute Diagnoses:  GALLBLADDER POLYPS    Chronic Medical Diagnoses:      Diet  1. Resume prior to surgery diet as tolerated. Activity  1. Do not drive a car or operate any hazardous machinery the day of surgery. 2. Rest quietly today. 3. No bending or heavy lifting. 4. You may resume other prior to surgery activities as tolerated. 5. You may remove the bandage and shower in 1 day. Drain / Wound Care  1. Wound care instructions exactly as explained by the Nurse at time of discharge. 1. Apply an ice pack to the surgical site for 48 hours. 2. Do not put any salves or ointments on the wound. Allow it to form a dry scab. 3. Leave steri-strips / Dermabond alone. They should be allowed to fall off on their own in 7-14 days. Medications  1. It is important to take your medications exactly as they are prescribed. 2. Keep your medication in the bottles provided by the pharmacist, and keep a list of the medication names, dosages, and times they should be taken in your wallet. Call 911 anytime you think you may need emergency care. For example, call if:  · You passed out (lost consciousness). · You have severe trouble breathing. · You have sudden chest pain and shortness of breath. Notify your Surgeon for any of the followin. Fever, chills, nausea, vomiting, severe abdominal pain or bleeding. 2. If you experience any redness or discharge or sign of infection. 3. Persistent nausea lasting more than 24 hours. If you are unable to reach your Surgeon for any of the symptoms above, you should proceed directly to the nearest Emergency Department.     Post-Operative Appointment Information    Call Dr. Deepa Samson office tomorrow morning at [phone] to schedule a post-operative office visit in one (1) week. If any questions or concerns arise, call your Surgeon at [phone]. DISCHARGE SUMMARY from Nurse    PATIENT INSTRUCTIONS:    After general anesthesia or intravenous sedation, for 24 hours or while taking prescription Narcotics:  · Limit your activities  · Do not drive and operate hazardous machinery  · Do not make important personal or business decisions  · Do  not drink alcoholic beverages  · If you have not urinated within 8 hours after discharge, please contact your surgeon on call. Report the following to your surgeon:  · Excessive pain, swelling, redness or odor of or around the surgical area  · Temperature over 100.5  · Nausea and vomiting lasting longer than 4 hours or if unable to take medications  · Any signs of decreased circulation or nerve impairment to extremity: change in color, persistent  numbness, tingling, coldness or increase pain  · Any questions    What to do at Home:  Recommended activity: Activity as tolerated and no driving for today, no driving if taking pain meds      Please give a list of your current medications to your Primary Care Provider. *  Please update this list whenever your medications are discontinued, doses are      changed, or new medications (including over-the-counter products) are added. *  Please carry medication information at all times in case of emergency situations. These are general instructions for a healthy lifestyle:    No smoking/ No tobacco products/ Avoid exposure to second hand smoke  Surgeon General's Warning:  Quitting smoking now greatly reduces serious risk to your health.     Obesity, smoking, and sedentary lifestyle greatly increases your risk for illness    A healthy diet, regular physical exercise & weight monitoring are important for maintaining a healthy lifestyle    You may be retaining fluid if you have a history of heart failure or if you experience any of the following symptoms:  Weight gain of 3 pounds or more overnight or 5 pounds in a week, increased swelling in our hands or feet or shortness of breath while lying flat in bed. Please call your doctor as soon as you notice any of these symptoms; do not wait until your next office visit. The discharge information has been reviewed with the patient and spouse. The patient and spouse verbalized understanding. Discharge medications reviewed with the patient and spouse and appropriate educational materials and side effects teaching were provided.   ___________________________________________________________________________________________________________________________________

## 2021-11-12 NOTE — DISCHARGE SUMMARY
Discharge Summary    Patient: Shawnee Hernández MRN: [id number]  CSN: [id number]    YOB: 1965  Age: 54 y.o. Sex: male    DOA: 4/7/2021 LOS:  LOS: 1 day   Discharge Date: 4/7/2021     Admission Diagnoses: S/P laparoscopic cholecystectomy [Z90.49]    Discharge Diagnoses:    Problem List as of 4/7/2021 Never Reviewed          Codes Class Noted - Resolved    Syncope ICD-10-CM: R55  ICD-9-CM: 780.2  2/14/2020 - Present        Dehydration ICD-10-CM: E86.0  ICD-9-CM: 276.51  2/14/2020 - Present        Diarrhea ICD-10-CM: R19.7  ICD-9-CM: 787.91  2/13/2020 - Present        S/P laparoscopic cholecystectomy ICD-10-CM: Z90.49  ICD-9-CM: V45.89  4/7/2021 - Present              Discharge Condition: Good    Hospital Course: Admitted for ap  [place] and liver biopsy. D/C home    Consults: None    Significant Diagnostic Studies: none    Discharge Medications:   Cannot display discharge medications since this patient is not currently admitted.       Activity: Activity as tolerated    Diet: Regular Diet    Wound Care: None needed    Follow-up: 2 weeks
show

## 2022-03-19 PROBLEM — R55 SYNCOPE: Status: ACTIVE | Noted: 2020-02-14

## 2022-03-20 PROBLEM — Z90.49 S/P LAPAROSCOPIC CHOLECYSTECTOMY: Status: ACTIVE | Noted: 2021-04-07

## 2022-03-20 PROBLEM — E86.0 DEHYDRATION: Status: ACTIVE | Noted: 2020-02-14

## 2022-03-20 PROBLEM — R19.7 DIARRHEA: Status: ACTIVE | Noted: 2020-02-13

## 2022-05-05 NOTE — DISCHARGE SUMMARY
Discharge Summary    Patient: Criss Alcantara MRN: [id number]  CSN: [id number]    YOB: 1965  Age: 47 y.o. Sex: male    DOA: 2/13/2020 LOS:  LOS: 0 days   Discharge Date:      Primary Care Provider:  Cira Fitzpatrick MD    Admission Diagnoses: Diarrhea [R19.7]    Discharge Diagnoses:    Problem List as of 2/15/2020 Never Reviewed          Codes Class Noted - Resolved    Syncope ICD-10-CM: R55  ICD-9-CM: 780.2  2/14/2020 - Present        * (Principal) Dehydration ICD-10-CM: E86.0  ICD-9-CM: 276.51  2/14/2020 - Present        Diarrhea ICD-10-CM: R19.7  ICD-9-CM: 787.91  2/13/2020 - Present              Discharge Medications: There are no discharge medications for this patient. Discharge Condition:  stable        PHYSICAL EXAM   Visit Vitals  /68   Pulse 61   Temp 98.5 °F (36.9 °C)   Resp 16   Ht 5' 8\" (1.727 m)   Wt 122.2 kg (269 lb 4.8 oz)   SpO2 97%   BMI 40.95 kg/m²     General: Awake, cooperative, no acute distress    HEENT: NC, Atraumatic. PERRLA, EOMI. Anicteric sclerae. Lungs:  CTA Bilaterally. No Wheezing/Rhonchi/Rales. Heart:  Regular  rhythm,  No murmur, No Rubs, No Gallops  Abdomen: Soft, Non distended, Non tender. +Bowel sounds,   Extremities: No c/c/e  Psych:   Not anxious or agitated. Neurologic:  No acute neurological deficits. Admission HPI : Criss Alcantara is a 47 y.o. male with only past medical history of fatty liver disease  and takes no home meds who presents to the emergency department C/O diarrhea, nausea, vomiting, diaphoresis, dizziness that started last night at approximately 11:30 PM. [facility] reports he felt fine when he went to sleep but then woke suddenly and had copious diarrhea.  He reports during one of these episodes he became dizzy and passed out while on the toilet and fell striking his head.  He denies any chest pain.  He does note he has some back pain since the syncopal episode.   Reports that while at work on Monday (today is Thursday) one of his coworkers vomited all over the exit door and floor where he had the passerby to leave the room. Denies fever, chills, night sweats, chest pain, shortness of breath or difficulty breathing. Is only passed out previously once before his adult life and that is when he had the flu. They did a right before he passed out he became very lightheaded and began sweating profusely. Denies a history of seizures. They say when he hit his head there was no laceration or blood but he did slightly make his tongue. Stated that he was given morphine for pain in the emergency room and that only made him feel worse, he did feel better after getting a dose of Toradol. Hospital Course : Admitted and given fluids and empiric antibiotics. His cultures all remained negative while in the hospital and he clinically improved with fluids    Activity: ad wendy    Diet: regular    Follow-up: with dr. Lakisha Plunkett on Monday for recheck and stool culture follow up        Disposition: home     Minutes spent on discharge: 35      Labs: Results:       Chemistry Recent Labs     02/15/20  0440 02/14/20  0100 02/13/20  0945   GLU 97 100* 136*    140 140   K 3.8 4.3 4.6    114* 110   CO2 25 20* 21   BUN 10 23* 29*   CREA 0.98 0.99 1.37*   CA 8.3* 7.7* 9.8   AGAP 6 6 9   BUCR 10* 23* 21*   AP  --   --  72   TP  --   --  7.7   ALB  --   --  4.4   GLOB  --   --  3.3   AGRAT  --   --  1.3      CBC w/Diff Recent Labs     02/15/20  0440 02/14/20  0100 02/13/20  0945   WBC 6.3 5.5 18.7*   RBC 4.23* 4.46* 5.17   HGB 12.6* 13.3 15.8   HCT 37.5 40.2 46.2    252 308   GRANS 58  --  95*   LYMPH 26  --  2*   EOS 3  --  0      Cardiac Enzymes Recent Labs     02/13/20  0945   CPK 82   CKND1 CALCULATION NOT PERFORMED WHEN RESULT IS BELOW LINEAR LIMIT      Coagulation No results for input(s): PTP, INR, APTT, INREXT in the last 72 hours.     Lipid Panel No results found for: CHOL, CHOLPOCT, [facility], CHLST, CHOLV, [id number], HDL, HDLP, LDL, LDLC, DLDLP, 789320, VLDLC, VLDL, TGLX, TRIGL, TRIGP, TGLPOCT, CHHD, CHHDX   BNP No results for input(s): BNPP in the last 72 hours. Liver Enzymes Recent Labs     02/13/20  0945   TP 7.7   ALB 4.4   AP 72   SGOT 16      Thyroid Studies No results found for: T4, T3U, TSH, TSHEXT         Significant Diagnostic Studies: Ct Abd Pelv W Cont    Result Date: 2/13/2020  EXAM: CT of the abdomen and pelvis CLINICAL INDICATION/HISTORY: abd pain   > Additional: None. COMPARISON: None. > Reference Exam: None. TECHNIQUE: Axial CT imaging of the abdomen and pelvis was performed with intravenous contrast. Multiplanar reformats were generated. One or more dose reduction techniques were used on this CT: automated exposure control, adjustment of the mAs and/or kVp according to patient size, and iterative reconstruction techniques. The specific techniques used on this CT exam have been documented in the patient's electronic medical record. Digital Imaging and Communications in Medicine (DICOM) format image data are available to nonaffiliated external healthcare facilities or entities on a secure, media free, reciprocally searchable basis with patient authorization for at least a 12-month period after this study. _______________ FINDINGS: LOWER CHEST: Mild dependent atelectasis. LIVER, BILIARY: There are a few subcentimeter hypodensities in the dome of the right hepatic lobe as well as more centrally which are nonspecific, not clearly cysts but most likely benign and may represent small hemangiomas. No biliary dilation. Gallbladder is unremarkable. PANCREAS: Normal. SPLEEN: Normal. ADRENALS: Normal. KIDNEYS/URETERS/BLADDER: Normal. LYMPH NODES: No enlarged lymph nodes. GASTROINTESTINAL TRACT: The stomach is markedly distended with ingested debris and air-fluid level. No bowel dilation or wall thickening. Normal appendix.  Several nondilated fluid-filled loops of bowel are noted throughout the abdomen. PELVIC ORGANS: Unremarkable. VASCULATURE: Unremarkable. BONES: No acute or aggressive osseous abnormalities identified. Unilateral pars defect noted on the right, chronic. OTHER: No ascites. Stranding along the subcutaneous fat overlying the right lateral gluteal musculature. _______________     IMPRESSION: 1.  Marked distention of the stomach, potentially benefiting from nasogastric decompression. No focal obstructing mass lesion is appreciated. Otherwise, no acute inflammatory process in the abdomen or pelvis. 2. Subcutaneous stranding overlying the right gluteal musculature may represent contusion. 3. Several subtle nonspecific subcentimeter hypodensities in the liver are too small to characterize, possibly small hemangiomas. Xr Chest Port    Result Date: 2/13/2020  EXAM: XR CHEST PORT CLINICAL INDICATION/HISTORY: syncope -Additional: None COMPARISON: None TECHNIQUE: Frontal view of the chest _______________ FINDINGS: HEART AND MEDIASTINUM: Normal cardiac size and mediastinal contours. LUNGS AND PLEURAL SPACES: Lungs are mildly underexpanded but clear. No focal pneumonic opacity. No pneumothorax or pleural effusion. BONY THORAX AND SOFT TISSUES: No acute osseous abnormality _______________     IMPRESSION: Lungs are mildly underexpanded without superimposed acute radiographic abnormality.                 CC: Jacob Ordonez MD Yes

## 2022-07-07 ENCOUNTER — TRANSCRIBE ORDER (OUTPATIENT)
Dept: REGISTRATION | Age: 57
End: 2022-07-07

## 2022-07-07 ENCOUNTER — HOSPITAL ENCOUNTER (OUTPATIENT)
Dept: LAB | Age: 57
Discharge: HOME | End: 2022-07-07
Payer: OTHER GOVERNMENT

## 2022-07-07 DIAGNOSIS — Z01.818 OTHER SPECIFIED PRE-OPERATIVE EXAMINATION: Primary | ICD-10-CM

## 2022-07-07 LAB
HCT VFR BLD AUTO: 42.6 % (ref 36–48)
HGB BLD-MCNC: 14.1 G/DL (ref 13–16)
POTASSIUM SERPL-SCNC: 4.7 MMOL/L (ref 3.5–5.5)

## 2022-07-07 PROCEDURE — 36415 COLL VENOUS BLD VENIPUNCTURE: CPT

## 2022-07-07 PROCEDURE — 85018 HEMOGLOBIN: CPT

## 2022-07-07 PROCEDURE — 84132 ASSAY OF SERUM POTASSIUM: CPT

## 2022-07-08 ENCOUNTER — HOSPITAL ENCOUNTER (OUTPATIENT)
Dept: LAB | Age: 57
Discharge: HOME | End: 2022-07-08
Payer: OTHER GOVERNMENT

## 2022-07-08 PROCEDURE — 88304 TISSUE EXAM BY PATHOLOGIST: CPT

## 2022-07-08 PROCEDURE — 87077 CULTURE AEROBIC IDENTIFY: CPT

## 2022-07-08 PROCEDURE — 87205 SMEAR GRAM STAIN: CPT

## 2022-07-08 PROCEDURE — 87186 SC STD MICRODIL/AGAR DIL: CPT

## 2022-07-08 PROCEDURE — 87075 CULTR BACTERIA EXCEPT BLOOD: CPT

## 2022-07-10 LAB
BACTERIA SPEC CULT: ABNORMAL
BACTERIA SPEC CULT: NORMAL
GRAM STN SPEC: ABNORMAL
GRAM STN SPEC: ABNORMAL
SERVICE CMNT-IMP: ABNORMAL
SERVICE CMNT-IMP: NORMAL

## 2022-09-23 ENCOUNTER — HOSPITAL ENCOUNTER (OUTPATIENT)
Age: 57
Setting detail: OUTPATIENT SURGERY
Discharge: HOME | End: 2022-09-23
Attending: SURGERY | Admitting: SURGERY
Payer: OTHER GOVERNMENT

## 2022-09-23 ENCOUNTER — ANESTHESIA EVENT (OUTPATIENT)
Dept: ENDOSCOPY | Age: 57
End: 2022-09-23
Payer: OTHER GOVERNMENT

## 2022-09-23 ENCOUNTER — ANESTHESIA (OUTPATIENT)
Dept: ENDOSCOPY | Age: 57
End: 2022-09-23
Payer: OTHER GOVERNMENT

## 2022-09-23 VITALS
HEART RATE: 70 BPM | TEMPERATURE: 97 F | SYSTOLIC BLOOD PRESSURE: 127 MMHG | DIASTOLIC BLOOD PRESSURE: 77 MMHG | RESPIRATION RATE: 18 BRPM | HEIGHT: 67 IN | BODY MASS INDEX: 40.78 KG/M2 | OXYGEN SATURATION: 97 %

## 2022-09-23 PROCEDURE — 74011250636 HC RX REV CODE- 250/636: Performed by: NURSE ANESTHETIST, CERTIFIED REGISTERED

## 2022-09-23 PROCEDURE — 88305 TISSUE EXAM BY PATHOLOGIST: CPT

## 2022-09-23 PROCEDURE — 76040000007: Performed by: SURGERY

## 2022-09-23 PROCEDURE — 77030021593 HC FCPS BIOP ENDOSC BSC -A: Performed by: SURGERY

## 2022-09-23 PROCEDURE — 76060000032 HC ANESTHESIA 0.5 TO 1 HR: Performed by: SURGERY

## 2022-09-23 PROCEDURE — 74011250636 HC RX REV CODE- 250/636: Performed by: SURGERY

## 2022-09-23 PROCEDURE — 2709999900 HC NON-CHARGEABLE SUPPLY: Performed by: SURGERY

## 2022-09-23 PROCEDURE — 74011000250 HC RX REV CODE- 250: Performed by: NURSE ANESTHETIST, CERTIFIED REGISTERED

## 2022-09-23 PROCEDURE — 77030040361 HC SLV COMPR DVT MDII -B: Performed by: SURGERY

## 2022-09-23 RX ORDER — PROPOFOL 10 MG/ML
INJECTION, EMULSION INTRAVENOUS AS NEEDED
Status: DISCONTINUED | OUTPATIENT
Start: 2022-09-23 | End: 2022-09-23 | Stop reason: HOSPADM

## 2022-09-23 RX ORDER — NALOXONE HYDROCHLORIDE 0.4 MG/ML
0.1 INJECTION, SOLUTION INTRAMUSCULAR; INTRAVENOUS; SUBCUTANEOUS AS NEEDED
Status: CANCELLED | OUTPATIENT
Start: 2022-09-23

## 2022-09-23 RX ORDER — SODIUM CHLORIDE 0.9 % (FLUSH) 0.9 %
5-40 SYRINGE (ML) INJECTION AS NEEDED
Status: CANCELLED | OUTPATIENT
Start: 2022-09-23

## 2022-09-23 RX ORDER — LIDOCAINE HYDROCHLORIDE 20 MG/ML
INJECTION, SOLUTION EPIDURAL; INFILTRATION; INTRACAUDAL; PERINEURAL AS NEEDED
Status: DISCONTINUED | OUTPATIENT
Start: 2022-09-23 | End: 2022-09-23 | Stop reason: HOSPADM

## 2022-09-23 RX ORDER — DIPHENHYDRAMINE HYDROCHLORIDE 50 MG/ML
12.5 INJECTION, SOLUTION INTRAMUSCULAR; INTRAVENOUS
Status: CANCELLED | OUTPATIENT
Start: 2022-09-23

## 2022-09-23 RX ORDER — HYDROMORPHONE HYDROCHLORIDE 2 MG/ML
0.5 INJECTION, SOLUTION INTRAMUSCULAR; INTRAVENOUS; SUBCUTANEOUS
Status: CANCELLED | OUTPATIENT
Start: 2022-09-23

## 2022-09-23 RX ORDER — SODIUM CHLORIDE, SODIUM LACTATE, POTASSIUM CHLORIDE, CALCIUM CHLORIDE 600; 310; 30; 20 MG/100ML; MG/100ML; MG/100ML; MG/100ML
100 INJECTION, SOLUTION INTRAVENOUS CONTINUOUS
Status: CANCELLED | OUTPATIENT
Start: 2022-09-23

## 2022-09-23 RX ORDER — SODIUM CHLORIDE 9 MG/ML
125 INJECTION, SOLUTION INTRAVENOUS CONTINUOUS
Status: DISCONTINUED | OUTPATIENT
Start: 2022-09-23 | End: 2022-09-23 | Stop reason: HOSPADM

## 2022-09-23 RX ORDER — ALBUTEROL SULFATE 0.83 MG/ML
2.5 SOLUTION RESPIRATORY (INHALATION)
Status: CANCELLED | OUTPATIENT
Start: 2022-09-23 | End: 2022-09-24

## 2022-09-23 RX ORDER — FENTANYL CITRATE 50 UG/ML
25 INJECTION, SOLUTION INTRAMUSCULAR; INTRAVENOUS AS NEEDED
Status: CANCELLED | OUTPATIENT
Start: 2022-09-23

## 2022-09-23 RX ORDER — SODIUM CHLORIDE 0.9 % (FLUSH) 0.9 %
5-40 SYRINGE (ML) INJECTION EVERY 8 HOURS
Status: CANCELLED | OUTPATIENT
Start: 2022-09-23

## 2022-09-23 RX ORDER — PROCHLORPERAZINE EDISYLATE 5 MG/ML
5 INJECTION INTRAMUSCULAR; INTRAVENOUS ONCE
Status: CANCELLED | OUTPATIENT
Start: 2022-09-23 | End: 2022-09-23

## 2022-09-23 RX ADMIN — PROPOFOL 100 MG: 10 INJECTION, EMULSION INTRAVENOUS at 09:16

## 2022-09-23 RX ADMIN — PROPOFOL 50 MG: 10 INJECTION, EMULSION INTRAVENOUS at 09:28

## 2022-09-23 RX ADMIN — PROPOFOL 50 MG: 10 INJECTION, EMULSION INTRAVENOUS at 09:25

## 2022-09-23 RX ADMIN — PROPOFOL 50 MG: 10 INJECTION, EMULSION INTRAVENOUS at 09:22

## 2022-09-23 RX ADMIN — SODIUM CHLORIDE 125 ML/HR: 9 INJECTION, SOLUTION INTRAVENOUS at 07:56

## 2022-09-23 RX ADMIN — PROPOFOL 100 MG: 10 INJECTION, EMULSION INTRAVENOUS at 09:19

## 2022-09-23 RX ADMIN — PROPOFOL 50 MG: 10 INJECTION, EMULSION INTRAVENOUS at 09:33

## 2022-09-23 RX ADMIN — LIDOCAINE HYDROCHLORIDE 80 MG: 20 INJECTION, SOLUTION INTRAVENOUS at 09:16

## 2022-09-23 NOTE — ANESTHESIA PREPROCEDURE EVALUATION
Relevant Problems   No relevant active problems       Anesthetic History     PONV          Review of Systems / Medical History  Patient summary reviewed, nursing notes reviewed and pertinent labs reviewed    Pulmonary        Sleep apnea: BiPAP      Pertinent negatives: No smoker     Neuro/Psych   Within defined limits           Cardiovascular  Within defined limits                Exercise tolerance: >4 METS     GI/Hepatic/Renal           Liver disease     Endo/Other        Morbid obesity     Other Findings              Physical Exam    Airway  Mallampati: III  TM Distance: > 6 cm  Neck ROM: normal range of motion   Mouth opening: Normal     Cardiovascular    Rhythm: regular  Rate: normal         Dental  No notable dental hx       Pulmonary  Breath sounds clear to auscultation               Abdominal  GI exam deferred       Other Findings            Anesthetic Plan    ASA: 3  Anesthesia type: MAC          Induction: Intravenous  Anesthetic plan and risks discussed with: Patient

## 2022-09-23 NOTE — PROCEDURES
Colonoscopy Procedure Note    Indications: screening for colorectal cancer    Surgeon(s): Surgeon(s) and Role:     Yuridia Alert, DO - Primary    Assistant(s): Endoscopy Technician-1: Madi Lunsford CNA  Endoscopy RN-1: Kateryna Yoo RN    Anesthesia: MAC     Pre-Procedure Exam:  Airway: clear   Heart: normal S1and S2    Lungs: clear bilateral  Abdomen: soft, nontender, bowel sounds present and normal in all quadrants   Mental Status: awake, alert, and oriented to person, place, and time      Procedure in Detail:  Informed consent was obtained for the procedure, including sedation. Risks of perforation, hemorrhage, adverse drug reaction, and aspiration were discussed. The patient was placed in the left lateral decubitus position. Based on the pre-procedure assessment, including review of the patient's medical history, medications, allergies, and review of systems, he had been deemed to be an appropriate candidate for moderate sedation; he was therefore sedated with the medications listed above. The patient was monitored continuously with ECG tracing, pulse oximetry, blood pressure monitoring, and direct observations. A digital rectal examination was performed. The [id number] was inserted into the rectum and advanced under direct vision to the cecum, which was identified by the ileocecal valve and appendiceal orifice. The quality of the colonic preparation was good. A careful inspection of the mucosa was made as the colonoscope was withdrawn, including a careful and complete straight view of the rectum and anorectal junction; findings and interventions are described below. Appropriate photodocumentation was obtained.     Findings: Perineum: external fistula opening right gluteal region  Rectum: internal fistula opening at dentate, several small flat polyps removed via forceps polypectomy  Sigmoid: Normal  Descending Colon: Normal  Transverse Colon: Normal  Ascending Colon: Normal  Cecum: Normal  Terminal Ileum: Not entered    Specimens: specimens were collected. EBL: None    Complications: None; patient tolerated the procedure well. Recommendations:   - visit with my NP for results    - Follow up with primary care physician. Signed By: DR. Shira Khan, [address] [place] [address] Colorectal Surgery                       9/23/2022

## 2022-09-23 NOTE — DISCHARGE INSTRUCTIONS
Arsen Bhatiapayal  [id number]  1965    COLON DISCHARGE INSTRUCTIONS    Discomfort:  Redness at IV site- apply warm compress to area; if redness or soreness persist- contact your physician  There may be a slight amount of blood passed from the rectum  Gaseous discomfort- walking, belching will help relieve any discomfort  You should not operate a vehicle for 12 hours  You should not engage in an occupation involving machinery or appliances for rest of today  You may not drink alcoholic beverages for at least 12 hours  Avoid making any critical decisions for at least 24 hour  DIET:   Regular or resume diet normally appropriate for you. - however -  remember your colon is empty and a heavy meal will produce gas. Avoid these foods:  vegetables, fried / greasy foods, carbonated drinks for today     ACTIVITY:  You may resume your normal daily activities it is recommended that you spend the remainder of the day resting -  avoid any strenuous activity. CALL M.D. ANY SIGN OF:   Increasing pain, nausea, vomiting  Abdominal distension (swelling)  New increased bleeding (oral or rectal)  Fever (chills)  Pain in chest area  Bloody discharge from nose or mouth  Shortness of breath      Follow-up Instructions:   If biopsies were taken, please call Dr. Henry Conklin office in next 2 weeks, [phone]. Otherwise follow up with your PCM.                         Arsen Avitia  [id number]  1965        DISCHARGE SUMMARY from Nurse    The following personal items collected during your admission are returned to you:   Dental Appliance:    Vision:    Hearing Aid:    Jewelry:    Clothing:    Other Valuables:    Valuables sent to safe:        DISCHARGE SUMMARY from Nurse    PATIENT INSTRUCTIONS:    After general anesthesia or intravenous sedation, for 24 hours or while taking prescription Narcotics:  Limit your activities  Do not drive and operate hazardous machinery  Do not make important personal or business decisions  Do  not drink alcoholic beverages  If you have not urinated within 8 hours after discharge, please contact your surgeon on call. Report the following to your surgeon:  Excessive pain, swelling, redness or odor of or around the surgical area  Temperature over 100.5  Nausea and vomiting lasting longer than 4 hours or if unable to take medications  Any signs of decreased circulation or nerve impairment to extremity: change in color, persistent  numbness, tingling, coldness or increase pain  Any questions    What to do at Home:  Recommended activity: as above,     If you experience any of the following symptoms as above, please follow up with Dr. Benny Tarmmell. *  Please give a list of your current medications to your Primary Care Provider. *  Please update this list whenever your medications are discontinued, doses are      changed, or new medications (including over-the-counter products) are added. *  Please carry medication information at all times in case of emergency situations. These are general instructions for a healthy lifestyle:    No smoking/ No tobacco products/ Avoid exposure to second hand smoke  Surgeon General's Warning:  Quitting smoking now greatly reduces serious risk to your health. Obesity, smoking, and sedentary lifestyle greatly increases your risk for illness    A healthy diet, regular physical exercise & weight monitoring are important for maintaining a healthy lifestyle    You may be retaining fluid if you have a history of heart failure or if you experience any of the following symptoms:  Weight gain of 3 pounds or more overnight or 5 pounds in a week, increased swelling in our hands or feet or shortness of breath while lying flat in bed. Please call your doctor as soon as you notice any of these symptoms; do not wait until your next office visit. The discharge information has been reviewed with the patient and spouse.   The patient and spouse verbalized understanding. Discharge medications reviewed with the patient and spouse and appropriate educational materials and side effects teaching were provided.   ___________________________________________________________________________________________________________________________________    Patient armband removed and shredded

## 2022-09-23 NOTE — ANESTHESIA POSTPROCEDURE EVALUATION
Procedure(s):  COLONOSCOPY WITH  MAC; POLYPECTOMY.     MAC    Anesthesia Post Evaluation      Multimodal analgesia: multimodal analgesia not used between 6 hours prior to anesthesia start to PACU discharge  Patient location during evaluation: PACU  Patient participation: complete - patient participated  Level of consciousness: awake and alert  Pain score: 0  Airway patency: patent  Anesthetic complications: no  Cardiovascular status: acceptable  Respiratory status: acceptable  Post anesthesia nausea and vomiting:  none  Final Post Anesthesia Temperature Assessment:  Normothermia (36.0-37.5 degrees C)      INITIAL Post-op Vital signs:   Vitals Value Taken Time   /77 09/23/22 1022   Temp 36.1 °C (97 °F) 09/23/22 0956   Pulse 70 09/23/22 1022   Resp 18 09/23/22 1022   SpO2 97 % 09/23/22 1022

## 2022-09-23 NOTE — H&P
Assessment/Plan  # Detail Type Description    1. Assessment Encntr for f/u exam aft trtmt for cond oth than malig neoplm (V75). Impression Pt doing well and expected to continue to improve. Path discussed with patient and activity restrictions reviewed. Pt can f/u with PCM. Cleared by surgery but may follow up PRN or for any worsening issues. Reviewed the process of healing with the patient and what to expect, including what is a normal colored drainage. Discussed the possibility of a Fistula formation. .    Patient Plan Plan: Continue to pack - wife is unable to pack with gauze and using Iodoform gauze dressing as it is easier to insert into the wound - wound is healing nicely. 2. Assessment Body mass index (BMI) 40.0-44.9, adult (Z68.41). This 64year old male presents for Post Op. History of Present Illness  1. Post Op   Patient arrived today following a Fistulotomy on 07/08/22 at [facility] with DR. Lara Del Castillo. He reports that he is doing well and wound care is being provided by his spouse. Education to be provided during todays visit.     07/18/22: Post op visit. 08/17/22: Post op visit - his spouse is concerned that the inside is healing before the outside and she is packing less. He was more concerned about the drainage - yellowish in color. 09/07/22 - Patient presented for a post op appointment with concerns with the healing process. C/O a lump at the incision site, no c/o fever, chills, N/V, His wife has been unable to insert the Iodoform gauze in very far. No discharge noted.             Problem List  Problem Description Onset Date Chronic Clinical Status Notes   Pneumonia  Y     Hemorrhoids  Y     Fatty liver  Y         Medications (active prior to today)  Medication Instructions Start Date Stop Date Refilled Elsewhere   Vitamin C 1,000 mg tablet take 1 tablet daily 01/08/2020   N   Vitamin D3 5,000 unit tablet take 1 by Oral route once 01/08/2020   N   L-Glutamine 500 mg tablet take 2 daily 01/08/2020   N   selenium 200 mcg capsule  01/08/2020   N   CoQ-10 100 mg capsule  01/08/2020   N   Glucosamine Chondroitin 550 mg-30 mg-1 mg capsule  01/08/2020   N   glucosamine HCl 1,500 mg tablet  01/08/2020   N   MagOx 400 mg (241.3 mg magnesium) tablet Take 400 mg by mouth daily. //   Y   vitamin E 400 unit capsule Take 400 Units by mouth daily. //   Y   MegaNatural- mg tablet,extended release takes two tablets to equal up to 300mg 06/13/2022   N   chromium amino acid chelate  //  06/13/2022 Y   zinc gluconate 30 mg tablet  06/13/2022   N   Probiotic (S.boulardii) 250 mg capsule  06/13/2022   N         Allergies  Ingredient Reaction (Severity) Medication Name Comment   NO KNOWN ALLERGIES          Review of Systems  System Neg/Pos Details   Constitutional Negative Chills, Fever, Malaise and Night sweats. ENMT Negative Ear drainage and Hearing loss. Eyes Negative Eye pain and Vision changes. Respiratory Negative Cough, Dyspnea, Known TB exposure and Wheezing. Cardio Negative Chest pain, Claudication, Edema and Irregular heartbeat/palpitations. GI Negative Abdominal pain, Constipation, Diarrhea, Heartburn and Vomiting.  Negative Dribbling, Dysuria and Hematuria. Endocrine Negative Cold intolerance and Heat intolerance. Neuro Negative Dizziness, Headache and Seizures. Psych Negative Insomnia and Impaired judgement. Integumentary Negative Change in shape/size of mole(s), Hair loss and Skin lesion. MS Negative Calf tenderness, Joint swelling and Muscle weakness. Hema/Lymph Negative Easy bleeding and Easy bruising. Allergic/Immuno Negative Contact allergy and Environmental allergies. Physical Exam  Exam Findings Details   General Exam Comments Incisions healing well. No evidence of infection. Able to insert end of Qtip in wound @2 cm, thereby opening blood bubble inside wound and allowing to drain. No pus or discoloration noted.          Medications (added, continued, or stopped this visit)  Start Date Medication Directions PRN Status PRN Reason Instruction Stop Date    chromium amino acid chelate  N      01/08/2020 CoQ-10 100 mg capsule  N      01/08/2020 Glucosamine Chondroitin 550 mg-30 mg-1 mg capsule  N      01/08/2020 glucosamine HCl 1,500 mg tablet  N      01/08/2020 L-Glutamine 500 mg tablet take 2 daily N       MagOx 400 mg (241.3 mg magnesium) tablet Take 400 mg by mouth daily. N      06/13/2022 MegaNatural- mg tablet,extended release takes two tablets to equal up to 300mg N      06/13/2022 Probiotic (S.boulardii) 250 mg capsule  N      01/08/2020 selenium 200 mcg capsule  N      01/08/2020 Vitamin C 1,000 mg tablet take 1 tablet daily N      01/08/2020 Vitamin D3 5,000 unit tablet take 1 by Oral route once N       vitamin E 400 unit capsule Take 400 Units by mouth daily.  N      06/13/2022 zinc gluconate 30 mg tablet  N        Active Patient Care Team Members  Name Contact Agency Type Support Role Relationship Active Date Inactive Date Specialty   [place]   encounter provider    Pulmonary Medicine   [facility]ier[facility] Hightower   Patient provider PCP   [facility]

## 2022-09-29 ENCOUNTER — TRANSCRIBE ORDER (OUTPATIENT)
Dept: REGISTRATION | Age: 57
End: 2022-09-29

## 2022-09-29 ENCOUNTER — HOSPITAL ENCOUNTER (OUTPATIENT)
Dept: PREADMISSION TESTING | Age: 57
Discharge: HOME | End: 2022-09-29

## 2022-09-29 ENCOUNTER — HOSPITAL ENCOUNTER (OUTPATIENT)
Dept: PREADMISSION TESTING | Age: 57
Discharge: HOME | End: 2022-09-29
Payer: OTHER GOVERNMENT

## 2022-09-29 VITALS — WEIGHT: 270 LBS | BODY MASS INDEX: 42.38 KG/M2 | HEIGHT: 67 IN

## 2022-09-29 DIAGNOSIS — Z01.812 BLOOD TESTS PRIOR TO TREATMENT OR PROCEDURE: ICD-10-CM

## 2022-09-29 DIAGNOSIS — K60.5 ANORECTAL FISTULA: Primary | ICD-10-CM

## 2022-09-29 DIAGNOSIS — K60.5 ANORECTAL FISTULA: ICD-10-CM

## 2022-09-29 DIAGNOSIS — Z01.818 OTHER SPECIFIED PRE-OPERATIVE EXAMINATION: ICD-10-CM

## 2022-09-29 LAB
ANION GAP SERPL CALC-SCNC: 4 MMOL/L (ref 3–18)
BASOPHILS # BLD: 0.1 K/UL (ref 0–0.1)
BASOPHILS NFR BLD: 1 % (ref 0–2)
BUN SERPL-MCNC: 14 MG/DL (ref 7–18)
BUN/CREAT SERPL: 14 (ref 12–20)
CALCIUM SERPL-MCNC: 9.6 MG/DL (ref 8.5–10.1)
CHLORIDE SERPL-SCNC: 107 MMOL/L (ref 100–111)
CO2 SERPL-SCNC: 28 MMOL/L (ref 21–32)
CREAT SERPL-MCNC: 0.99 MG/DL (ref 0.6–1.3)
DIFFERENTIAL METHOD BLD: NORMAL
EOSINOPHIL # BLD: 0.1 K/UL (ref 0–0.4)
EOSINOPHIL NFR BLD: 2 % (ref 0–5)
ERYTHROCYTE [DISTWIDTH] IN BLOOD BY AUTOMATED COUNT: 11.9 % (ref 11.6–14.5)
GLUCOSE SERPL-MCNC: 84 MG/DL (ref 74–99)
HCT VFR BLD AUTO: 40.3 % (ref 36–48)
HGB BLD-MCNC: 13.7 G/DL (ref 13–16)
IMM GRANULOCYTES # BLD AUTO: 0 K/UL (ref 0–0.04)
IMM GRANULOCYTES NFR BLD AUTO: 0 % (ref 0–0.5)
LYMPHOCYTES # BLD: 2.4 K/UL (ref 0.9–3.6)
LYMPHOCYTES NFR BLD: 30 % (ref 21–52)
MCH RBC QN AUTO: 30.6 PG (ref 24–34)
MCHC RBC AUTO-ENTMCNC: 34 G/DL (ref 31–37)
MCV RBC AUTO: 90 FL (ref 78–100)
MONOCYTES # BLD: 0.8 K/UL (ref 0.05–1.2)
MONOCYTES NFR BLD: 9 % (ref 3–10)
NEUTS SEG # BLD: 4.8 K/UL (ref 1.8–8)
NEUTS SEG NFR BLD: 59 % (ref 40–73)
NRBC # BLD: 0 K/UL (ref 0–0.01)
NRBC BLD-RTO: 0 PER 100 WBC
PLATELET # BLD AUTO: 340 K/UL (ref 135–420)
PMV BLD AUTO: 9.8 FL (ref 9.2–11.8)
POTASSIUM SERPL-SCNC: 4.1 MMOL/L (ref 3.5–5.5)
RBC # BLD AUTO: 4.48 M/UL (ref 4.35–5.65)
SODIUM SERPL-SCNC: 139 MMOL/L (ref 136–145)
WBC # BLD AUTO: 8.2 K/UL (ref 4.6–13.2)

## 2022-09-29 PROCEDURE — 36415 COLL VENOUS BLD VENIPUNCTURE: CPT

## 2022-09-29 PROCEDURE — 80048 BASIC METABOLIC PNL TOTAL CA: CPT

## 2022-09-29 PROCEDURE — 85025 COMPLETE CBC W/AUTO DIFF WBC: CPT

## 2022-09-29 NOTE — PERIOP NOTES
PAT - SURGICAL PRE-ADMISSION INSTRUCTIONS    NAME:  Carleen Alcantara DATE:  9/29/2022    SURGERY DATE:  10/3/2022                           SURGERY ARRIVAL TIME:   TBD    Do NOT eat or drink anything, including candy or gum, after MIDNIGHT on 10-3-22, unless you have specific instructions from your Surgeon or Anesthesia Provider to do so. No smoking 24 hours before surgery. No alcohol 24 hours prior to the day of surgery. No recreational drugs for one week prior to the day of surgery. Leave all valuables, including money/purse, at home. Remove all jewelry, nail polish, makeup (including mascara); no lotions, powders, deodorant, or perfume/cologne/after shave. Glasses/Contact lenses and Dentures may be worn to the hospital.  They will be removed prior to surgery. Call your doctor if symptoms of a cold or illness develop within 24 ours prior to surgery. AN ADULT MUST DRIVE YOU HOME AFTER OUTPATIENT SURGERY. If you are having an OUTPATIENT procedure, please make arrangements for a responsible adult to be with you for 24 hours after your surgery. If you are admitted to the hospital, you will be assigned to a bed after surgery is complete. Normally a family member will not be able to see you until you are in your assigned bed. 12. Visitation Restrictions Explained. Patient does not have a DNR order. Special Instructions:  Covid Test Pt is vaccinated, Quarantine requirements discussed  Complete bowel prep per MD instructions. Pt has not received any instructions for bowel prep. Instructed patient to call surgeon's office to inquire if this is needed prior to surgery. No NSAIDs 7 days prior to surgery  Stop supplements 2 weeks prior to surgery    Patient Prep:    use CHG solution, Instructions provided to start washes 3 days prior to surgery. These surgical instructions were reviewed with patient during the PAT visit.   Pt verbalized understanding of instructions provided.

## 2022-10-03 ENCOUNTER — HOSPITAL ENCOUNTER (OUTPATIENT)
Age: 57
Setting detail: OUTPATIENT SURGERY
Discharge: HOME | End: 2022-10-03
Attending: SURGERY | Admitting: SURGERY
Payer: OTHER GOVERNMENT

## 2022-10-03 ENCOUNTER — ANESTHESIA (OUTPATIENT)
Dept: SURGERY | Age: 57
End: 2022-10-03
Payer: OTHER GOVERNMENT

## 2022-10-03 ENCOUNTER — ANESTHESIA EVENT (OUTPATIENT)
Dept: SURGERY | Age: 57
End: 2022-10-03
Payer: OTHER GOVERNMENT

## 2022-10-03 VITALS
OXYGEN SATURATION: 99 % | RESPIRATION RATE: 18 BRPM | WEIGHT: 274.9 LBS | SYSTOLIC BLOOD PRESSURE: 155 MMHG | DIASTOLIC BLOOD PRESSURE: 89 MMHG | HEIGHT: 67 IN | BODY MASS INDEX: 43.15 KG/M2 | HEART RATE: 54 BPM | TEMPERATURE: 97.7 F

## 2022-10-03 DIAGNOSIS — K60.5 ANORECTAL FISTULA: Primary | ICD-10-CM

## 2022-10-03 PROCEDURE — 74011250636 HC RX REV CODE- 250/636: Performed by: ANESTHESIOLOGY

## 2022-10-03 PROCEDURE — 77030002996 HC SUT SLK J&J -A: Performed by: SURGERY

## 2022-10-03 PROCEDURE — 74011000250 HC RX REV CODE- 250: Performed by: SURGERY

## 2022-10-03 PROCEDURE — 74011000250 HC RX REV CODE- 250: Performed by: ANESTHESIOLOGY

## 2022-10-03 PROCEDURE — 77030020782 HC GWN BAIR PAWS FLX 3M -B: Performed by: SURGERY

## 2022-10-03 PROCEDURE — 74011250636 HC RX REV CODE- 250/636: Performed by: SURGERY

## 2022-10-03 PROCEDURE — 76210000023 HC REC RM PH II 2 TO 2.5 HR: Performed by: SURGERY

## 2022-10-03 PROCEDURE — 77030040361 HC SLV COMPR DVT MDII -B: Performed by: SURGERY

## 2022-10-03 PROCEDURE — 76060000032 HC ANESTHESIA 0.5 TO 1 HR: Performed by: SURGERY

## 2022-10-03 PROCEDURE — 77030031139 HC SUT VCRL2 J&J -A: Performed by: SURGERY

## 2022-10-03 PROCEDURE — 2709999900 HC NON-CHARGEABLE SUPPLY: Performed by: SURGERY

## 2022-10-03 PROCEDURE — 74011000272 HC RX REV CODE- 272: Performed by: SURGERY

## 2022-10-03 PROCEDURE — 77030031140 HC SUT VCRL3 J&J -A: Performed by: SURGERY

## 2022-10-03 PROCEDURE — 76010000138 HC OR TIME 0.5 TO 1 HR: Performed by: SURGERY

## 2022-10-03 RX ORDER — SODIUM CHLORIDE, SODIUM LACTATE, POTASSIUM CHLORIDE, CALCIUM CHLORIDE 600; 310; 30; 20 MG/100ML; MG/100ML; MG/100ML; MG/100ML
125 INJECTION, SOLUTION INTRAVENOUS CONTINUOUS
Status: DISCONTINUED | OUTPATIENT
Start: 2022-10-03 | End: 2022-10-03 | Stop reason: HOSPADM

## 2022-10-03 RX ORDER — HYDROMORPHONE HYDROCHLORIDE 1 MG/ML
0.5 INJECTION, SOLUTION INTRAMUSCULAR; INTRAVENOUS; SUBCUTANEOUS
Status: DISCONTINUED | OUTPATIENT
Start: 2022-10-03 | End: 2022-10-03 | Stop reason: HOSPADM

## 2022-10-03 RX ORDER — SODIUM CHLORIDE, SODIUM LACTATE, POTASSIUM CHLORIDE, CALCIUM CHLORIDE 600; 310; 30; 20 MG/100ML; MG/100ML; MG/100ML; MG/100ML
100 INJECTION, SOLUTION INTRAVENOUS CONTINUOUS
Status: DISCONTINUED | OUTPATIENT
Start: 2022-10-03 | End: 2022-10-03 | Stop reason: HOSPADM

## 2022-10-03 RX ORDER — CEFAZOLIN SODIUM/WATER 2 G/20 ML
2 SYRINGE (ML) INTRAVENOUS ONCE
Status: DISCONTINUED | OUTPATIENT
Start: 2022-10-03 | End: 2022-10-03

## 2022-10-03 RX ORDER — SODIUM CHLORIDE 0.9 % (FLUSH) 0.9 %
5-40 SYRINGE (ML) INJECTION EVERY 8 HOURS
Status: DISCONTINUED | OUTPATIENT
Start: 2022-10-03 | End: 2022-10-03 | Stop reason: HOSPADM

## 2022-10-03 RX ORDER — SODIUM CHLORIDE 0.9 % (FLUSH) 0.9 %
5-40 SYRINGE (ML) INJECTION AS NEEDED
Status: DISCONTINUED | OUTPATIENT
Start: 2022-10-03 | End: 2022-10-03 | Stop reason: HOSPADM

## 2022-10-03 RX ORDER — PROPOFOL 10 MG/ML
INJECTION, EMULSION INTRAVENOUS AS NEEDED
Status: DISCONTINUED | OUTPATIENT
Start: 2022-10-03 | End: 2022-10-03 | Stop reason: HOSPADM

## 2022-10-03 RX ORDER — FENTANYL CITRATE 50 UG/ML
25 INJECTION, SOLUTION INTRAMUSCULAR; INTRAVENOUS AS NEEDED
Status: DISCONTINUED | OUTPATIENT
Start: 2022-10-03 | End: 2022-10-03 | Stop reason: HOSPADM

## 2022-10-03 RX ORDER — ALBUTEROL SULFATE 0.83 MG/ML
2.5 SOLUTION RESPIRATORY (INHALATION)
Status: DISCONTINUED | OUTPATIENT
Start: 2022-10-03 | End: 2022-10-03 | Stop reason: HOSPADM

## 2022-10-03 RX ORDER — PROCHLORPERAZINE EDISYLATE 5 MG/ML
5 INJECTION INTRAMUSCULAR; INTRAVENOUS ONCE
Status: DISCONTINUED | OUTPATIENT
Start: 2022-10-03 | End: 2022-10-03 | Stop reason: HOSPADM

## 2022-10-03 RX ORDER — NALOXONE HYDROCHLORIDE 0.4 MG/ML
0.1 INJECTION, SOLUTION INTRAMUSCULAR; INTRAVENOUS; SUBCUTANEOUS AS NEEDED
Status: DISCONTINUED | OUTPATIENT
Start: 2022-10-03 | End: 2022-10-03 | Stop reason: HOSPADM

## 2022-10-03 RX ORDER — MIDAZOLAM HYDROCHLORIDE 1 MG/ML
INJECTION, SOLUTION INTRAMUSCULAR; INTRAVENOUS AS NEEDED
Status: DISCONTINUED | OUTPATIENT
Start: 2022-10-03 | End: 2022-10-03 | Stop reason: HOSPADM

## 2022-10-03 RX ORDER — PROPOFOL 10 MG/ML
INJECTION, EMULSION INTRAVENOUS
Status: DISCONTINUED | OUTPATIENT
Start: 2022-10-03 | End: 2022-10-03 | Stop reason: HOSPADM

## 2022-10-03 RX ORDER — FENTANYL CITRATE 50 UG/ML
INJECTION, SOLUTION INTRAMUSCULAR; INTRAVENOUS AS NEEDED
Status: DISCONTINUED | OUTPATIENT
Start: 2022-10-03 | End: 2022-10-03 | Stop reason: HOSPADM

## 2022-10-03 RX ORDER — KETAMINE HYDROCHLORIDE 10 MG/ML
INJECTION, SOLUTION INTRAMUSCULAR; INTRAVENOUS AS NEEDED
Status: DISCONTINUED | OUTPATIENT
Start: 2022-10-03 | End: 2022-10-03 | Stop reason: HOSPADM

## 2022-10-03 RX ORDER — DIPHENHYDRAMINE HYDROCHLORIDE 50 MG/ML
12.5 INJECTION, SOLUTION INTRAMUSCULAR; INTRAVENOUS
Status: DISCONTINUED | OUTPATIENT
Start: 2022-10-03 | End: 2022-10-03 | Stop reason: HOSPADM

## 2022-10-03 RX ORDER — DIAZEPAM 5 MG/1
5 TABLET ORAL
Qty: 10 TABLET | Refills: 0 | Status: SHIPPED | OUTPATIENT
Start: 2022-10-03

## 2022-10-03 RX ORDER — LIDOCAINE HYDROCHLORIDE 20 MG/ML
INJECTION, SOLUTION EPIDURAL; INFILTRATION; INTRACAUDAL; PERINEURAL AS NEEDED
Status: DISCONTINUED | OUTPATIENT
Start: 2022-10-03 | End: 2022-10-03 | Stop reason: HOSPADM

## 2022-10-03 RX ADMIN — KETAMINE HYDROCHLORIDE 20 MG: 10 INJECTION, SOLUTION INTRAMUSCULAR; INTRAVENOUS at 11:58

## 2022-10-03 RX ADMIN — PROPOFOL 20 MG: 10 INJECTION, EMULSION INTRAVENOUS at 12:09

## 2022-10-03 RX ADMIN — PROPOFOL 100 MCG/KG/MIN: 10 INJECTION, EMULSION INTRAVENOUS at 11:57

## 2022-10-03 RX ADMIN — SODIUM CHLORIDE, SODIUM LACTATE, POTASSIUM CHLORIDE, AND CALCIUM CHLORIDE 125 ML/HR: 600; 310; 30; 20 INJECTION, SOLUTION INTRAVENOUS at 08:11

## 2022-10-03 RX ADMIN — LIDOCAINE HYDROCHLORIDE 60 MG: 20 INJECTION, SOLUTION INTRAVENOUS at 11:57

## 2022-10-03 RX ADMIN — KETAMINE HYDROCHLORIDE 30 MG: 10 INJECTION, SOLUTION INTRAMUSCULAR; INTRAVENOUS at 11:51

## 2022-10-03 RX ADMIN — MIDAZOLAM 2 MG: 1 INJECTION INTRAMUSCULAR; INTRAVENOUS at 11:42

## 2022-10-03 RX ADMIN — PROPOFOL 20 MG: 10 INJECTION, EMULSION INTRAVENOUS at 11:57

## 2022-10-03 RX ADMIN — Medication 3 G: at 11:57

## 2022-10-03 RX ADMIN — SODIUM CHLORIDE, SODIUM LACTATE, POTASSIUM CHLORIDE, AND CALCIUM CHLORIDE: 600; 310; 30; 20 INJECTION, SOLUTION INTRAVENOUS at 11:42

## 2022-10-03 RX ADMIN — HYDROMORPHONE HYDROCHLORIDE 0.5 MG: 1 INJECTION, SOLUTION INTRAMUSCULAR; INTRAVENOUS; SUBCUTANEOUS at 14:02

## 2022-10-03 RX ADMIN — FENTANYL CITRATE 100 MCG: 50 INJECTION, SOLUTION INTRAMUSCULAR; INTRAVENOUS at 11:46

## 2022-10-03 RX ADMIN — PROPOFOL 20 MG: 10 INJECTION, EMULSION INTRAVENOUS at 12:13

## 2022-10-03 NOTE — PERIOP NOTES
Reviewed PTA medication list with patient/caregiver and patient/caregiver denies any additional medications. Patient admits to having a responsible adult care for them at home for at least 24 hours after surgery. Patient encouraged to use gown warming system and informed that using said warming gown to regulate body temperature prior to a procedure has been shown to help reduce the risks of blood clots and infection. Patient's pharmacy of choice verified and documented in PTA medication section. Dual skin assessment & fall risk band verification completed with Stephane Montilla RN.

## 2022-10-03 NOTE — PERIOP NOTES
TRANSFER - IN REPORT:    Verbal report received from OR RN (name) on José Regulus  being received from [facility]) for routine post - op      Report consisted of patients Situation, Background, Assessment and   Recommendations(SBAR). Information from the following report(s) SBAR and OR Summary was reviewed with the receiving nurse. Opportunity for questions and clarification was provided. Assessment completed upon patients arrival to unit and care assumed.

## 2022-10-03 NOTE — BRIEF OP NOTE
Brief Postoperative Note    Patient: Lynn Whitman  YOB: 1965  MRN: [id number]    Date of Procedure: 10/3/2022     Pre-Op Diagnosis: ANORECTAL FISTULA    Post-Op Diagnosis: Same as preoperative diagnosis.       Procedure(s):  EUA ANORECTAL FISTULA WITH SETON PLACEMENT,ANORECTAL FISTULA *MAC AND LOCAL ANESTHESIA*    Surgeon(s):  Terence Leo DO    Surgical Assistant: Surg Asst-1: Neeta Minus    Anesthesia: MAC     Estimated Blood Loss (mL): Minimal    Complications: None    Specimens: * No specimens in log *     Implants: * No implants in log *    Drains: * No LDAs found *    Findings: long tract RAL external opening, transsphincteric with 75% muscle involvement, internal opening at post midline at puborectalis, scarring and severe engorged internal hemorrhoids    Electronically Signed by Ananda Herr DO on 10/3/2022 at 1:03 PM
normal

## 2022-10-03 NOTE — ANESTHESIA PREPROCEDURE EVALUATION
Relevant Problems   No relevant active problems       Anesthetic History   No history of anesthetic complications            Review of Systems / Medical History  Patient summary reviewed, nursing notes reviewed and pertinent labs reviewed    Pulmonary        Sleep apnea: BiPAP      Pertinent negatives: No smoker     Neuro/Psych   Within defined limits           Cardiovascular                  Exercise tolerance: >4 METS     GI/Hepatic/Renal           Liver disease  Pertinent negatives: No hiatal hernia and GERD   Endo/Other        Morbid obesity     Other Findings              Physical Exam    Airway  Mallampati: III  TM Distance: > 6 cm  Neck ROM: normal range of motion   Mouth opening: Normal     Cardiovascular    Rhythm: regular  Rate: normal         Dental  No notable dental hx       Pulmonary  Breath sounds clear to auscultation               Abdominal  GI exam deferred       Other Findings            Anesthetic Plan    ASA: 3  Anesthesia type: MAC          Induction: Intravenous  Anesthetic plan and risks discussed with: Patient

## 2022-10-03 NOTE — ANESTHESIA POSTPROCEDURE EVALUATION
Procedure(s):  ANORECTAL FISTULOTOMY WITH SETON PLACEMENT,ANORECTAL FISTULA *MAC AND LOCAL ANESTHESIA*. MAC    Anesthesia Post Evaluation      Multimodal analgesia: multimodal analgesia used between 6 hours prior to anesthesia start to PACU discharge  Patient location during evaluation: PACU  Patient participation: complete - patient participated  Level of consciousness: awake and alert  Pain score: 0  Airway patency: patent  Anesthetic complications: no  Cardiovascular status: acceptable  Respiratory status: acceptable  Hydration status: acceptable  Post anesthesia nausea and vomiting:  none  Final Post Anesthesia Temperature Assessment:  Normothermia (36.0-37.5 degrees C)      INITIAL Post-op Vital signs: No vitals data found for the desired time range.

## 2022-10-03 NOTE — PERIOP NOTES
Dr. Marissa Tee aware that patient has refused to do the enema today prior to surgery, ok to proceed.

## 2022-10-03 NOTE — DISCHARGE INSTRUCTIONS
Post-Operative Discharge Instructions  Arley Woodward DO, [address], [facility]  [address] [place] [address]  [phone]    Patient: Shane Bear MRN: [id number]  CSN: [id number]    YOB: 1965  Age: 64 y.o. Sex: male    DOA: 10/3/2022 LOS: [unfilled]  Discharge Date: [unfilled]     Acute Diagnoses:  ANORECTAL FISTULA    Chronic Medical Diagnoses:  [unfilled]      IMPORTANT Instructions for Patients Having   Hemorrhoid or other Anal Surgery    After Surgery:    Take senokot-S or miralax 1-2x a day with a large, extra glass of water to soften the stool. ICE to area for swelling. place GAUZE between buttocks at anus 3x/day to absorb moisture. Maxi pads do NOT work - only protect clothes not skin. Begin Sitz baths by soaking in a warm to hot bathtub the night of surgery and 3-4 times per day thereafter. You may eat anything you want. Expect a small amount of bleeding (several tablespoons). If the bleeding is excessive, call the office. You may drive and resume your normal activities 24-48 hours after surgery if you are not taking prescription pain-killers or muscle relaxants. If your bowels do not move in 2-3 days, use magnesium citrate. Take one bottle and repeat another bottle if no effect in 4-6hrs. Call the office if you have any problems or questions. Your post op appointment will be 4 weeks after surgery if needed. (no post op appt for botox or RBL unless problems)    Medications  1) Valium for rectal spasm  2) Calmoseptine paste to protect skin from moisture or irritation. Over the counter. 3) Balneol (anal cleansing lotion) to clean anus - avoid baby wipes and preparation H/Tucks products. Over the counter  4) Laxative such as senokot-S, Miralax, Milk of Magnesia - start day 1 and hold for diarrhea  5) Ibuprofen 800mg every 8 hrs around the clock. 6) Topical lidocaine 5% ointment (frequently not covered by insurance.  If not, get over the counter e.g. Kristin Maine- 29$)       DISCHARGE SUMMARY from Nurse    PATIENT INSTRUCTIONS:    After general anesthesia or intravenous sedation, for 24 hours or while taking prescription Narcotics:  Limit your activities  Do not drive and operate hazardous machinery  Do not make important personal or business decisions  Do  not drink alcoholic beverages  If you have not urinated within 8 hours after discharge, please contact your surgeon on call. Report the following to your surgeon:  Excessive pain, swelling, redness or odor of or around the surgical area  Temperature over 100.5  Nausea and vomiting lasting longer than 4 hours or if unable to take medications  Any signs of decreased circulation or nerve impairment to extremity: change in color, persistent  numbness, tingling, coldness or increase pain  Any questions    What to do at Home:  Recommended activity: Activity as tolerated and no driving for today      *  Please give a list of your current medications to your Primary Care Provider. *  Please update this list whenever your medications are discontinued, doses are      changed, or new medications (including over-the-counter products) are added. *  Please carry medication information at all times in case of emergency situations. These are general instructions for a healthy lifestyle:    No smoking/ No tobacco products/ Avoid exposure to second hand smoke  Surgeon General's Warning:  Quitting smoking now greatly reduces serious risk to your health.     Obesity, smoking, and sedentary lifestyle greatly increases your risk for illness    A healthy diet, regular physical exercise & weight monitoring are important for maintaining a healthy lifestyle    You may be retaining fluid if you have a history of heart failure or if you experience any of the following symptoms:  Weight gain of 3 pounds or more overnight or 5 pounds in a week, increased swelling in our hands or feet or shortness of breath while lying flat in bed. Please call your doctor as soon as you notice any of these symptoms; do not wait until your next office visit. The discharge information has been reviewed with the patient and caregiver. The patient and caregiver verbalized understanding. Discharge medications reviewed with the patient and caregiver and appropriate educational materials and side effects teaching were provided.     Patient armband removed and shredded   ___________________________________________________________________________________________________________________________________

## 2022-10-03 NOTE — H&P
History of Present Illness  1. Post Op   Patient arrived today following a Fistulotomy on 07/08/22 at [facility] with DR. Jeniffer Herrmann. He reports that he is doing well and wound care is being provided by his spouse. Education to be provided during todays visit.     07/18/22: Post op visit. 08/17/22: Post op visit - his spouse is concerned that the inside is healing before the outside and she is packing less. He was more concerned about the drainage - yellowish in color. 09/07/22 - Patient presented for a post op appointment with concerns with the healing process. C/O a lump at the incision site, no c/o fever, chills, N/V, His wife has been unable to insert the Iodoform gauze in very far. No discharge noted. Problem List  Problem Description Onset Date Chronic Clinical Status Notes   Pneumonia  Y     Hemorrhoids  Y     Fatty liver  Y         Medications (active prior to today)  Medication Instructions Start Date Stop Date Refilled Elsewhere   Vitamin C 1,000 mg tablet take 1 tablet daily 01/08/2020   N   Vitamin D3 5,000 unit tablet take 1 by Oral route once 01/08/2020   N   L-Glutamine 500 mg tablet take 2 daily 01/08/2020   N   selenium 200 mcg capsule  01/08/2020   N   CoQ-10 100 mg capsule  01/08/2020   N   Glucosamine Chondroitin 550 mg-30 mg-1 mg capsule  01/08/2020   N   glucosamine HCl 1,500 mg tablet  01/08/2020   N   MagOx 400 mg (241.3 mg magnesium) tablet Take 400 mg by mouth daily. //   Y   vitamin E 400 unit capsule Take 400 Units by mouth daily.  //   Y   MegaNatural- mg tablet,extended release takes two tablets to equal up to 300mg 06/13/2022   N   chromium amino acid chelate  //  06/13/2022 Y   zinc gluconate 30 mg tablet  06/13/2022   N   Probiotic (S.boulardii) 250 mg capsule  06/13/2022   N         Allergies  Ingredient Reaction (Severity) Medication Name Comment   NO KNOWN ALLERGIES          Review of Systems  System Neg/Pos Details   Constitutional Negative Chills, Fever, Malaise and Night sweats. ENMT Negative Ear drainage and Hearing loss. Eyes Negative Eye pain and Vision changes. Respiratory Negative Cough, Dyspnea, Known TB exposure and Wheezing. Cardio Negative Chest pain, Claudication, Edema and Irregular heartbeat/palpitations. GI Negative Abdominal pain, Constipation, Diarrhea, Heartburn and Vomiting.  Negative Dribbling, Dysuria and Hematuria. Endocrine Negative Cold intolerance and Heat intolerance. Neuro Negative Dizziness, Headache and Seizures. Psych Negative Insomnia and Impaired judgement. Integumentary Negative Change in shape/size of mole(s), Hair loss and Skin lesion. MS Negative Calf tenderness, Joint swelling and Muscle weakness. Hema/Lymph Negative Easy bleeding and Easy bruising. Allergic/Immuno Negative Contact allergy and Environmental allergies. Physical Exam  Exam Findings Details   General Exam Comments Incisions healing well. No evidence of infection. Able to insert end of Qtip in wound @2 cm, thereby opening blood bubble inside wound and allowing to drain. No pus or discoloration noted. Medications (added, continued, or stopped this visit)  Start Date Medication Directions PRN Status PRN Reason Instruction Stop Date    chromium amino acid chelate  N      01/08/2020 CoQ-10 100 mg capsule  N      01/08/2020 Glucosamine Chondroitin 550 mg-30 mg-1 mg capsule  N      01/08/2020 glucosamine HCl 1,500 mg tablet  N      01/08/2020 L-Glutamine 500 mg tablet take 2 daily N       MagOx 400 mg (241.3 mg magnesium) tablet Take 400 mg by mouth daily.  N      06/13/2022 MegaNatural- mg tablet,extended release takes two tablets to equal up to 300mg N      06/13/2022 Probiotic (S.boulardii) 250 mg capsule  N      01/08/2020 selenium 200 mcg capsule  N      01/08/2020 Vitamin C 1,000 mg tablet take 1 tablet daily N      01/08/2020 Vitamin D3 5,000 unit tablet take 1 by Oral route once N       vitamin E 400 unit capsule Take 400 Units by mouth daily. N      06/13/2022 zinc gluconate 30 mg tablet  N        Active Patient Care Team Members  Name Contact Agency Type Support Role Relationship Active Date Inactive Date Specialty   Adrian Brantley   encounter provider    Pulmonary Medicine   Corinne Gourd   Patient provider PCP   Family Practice       ASSESSMENT  ANORECTAL FISTULA    Plan seton placement for control and management for travel. Will perform definitive surgery when pt has availability for healing.

## 2022-10-03 NOTE — OP NOTES
OPERATIVE NOTE    Patient: Markus Abad MRN: [id number]  SSN: [id number]    YOB: 1965  Age: 64 y.o. Sex: male      Indications: This is a 64y.o. year-old male who presents with anorectal fistula. The patient was admitted for surgery as conservative measures have failed. Date of Procedure: 10/3/2022     Preoperative Diagnosis: ANORECTAL FISTULA    Postoperative Diagnosis: ANORECTAL FISTULA      Procedure: Procedure(s):  EUA with ANORECTAL FISTULA SETON PLACEMENT, *MAC AND LOCAL ANESTHESIA*    Surgeon(s): Surgeon(s) and Role:     Constance Rob DO - Primary    Assistant(s): Circ-1: Micha Roca RN  Scrub Tech-1: Emir Machuca  Scrub Tech-Relief: Rodriguez Basilio  Scrub RN-1: Radha Gutierres RN  Surg Asst-1: Vicky Mohan    Anesthesia: MAC     Procedure: The patient prepped preprocedure with a fleets enema. Pt was counseled on the risks of the procedure including suboptimal/no effect, recurrence, bleeding, infection, wound healing problems, injury to local neurovascular structures, urinary retention requiring intervention, thrombosed external hemorrhoids, skin irritation, gas or fecal incontinence, anal stenosis, severe chronic pain, and allergic or adverse reaction to medications. After obtaining informed consent and properly identifying the patient and area of focus, the patient expressed understanding and signed consent. The patient was then taken to the operating room and was placed in prone jackknife position. All pressure points were padded. A time out was performed. The patients perineum was prepped and draped in the standard surgical fashion. MAC sedation and an anal block using 2% lidocaine and .5% Marcaine 50:50 solution was administered as well as infiltration of the hal-fistula cutaneous tract. An anoscope was placed in the anus.  Hydrogen peroxide was administered through the external opening with an angiocatheter which was located in the right gluteal region approximately 8 cm from the anal verge in the posterolateral position. Extravasation of H2O2 was seen through the internal opening in posterior midline confirming the fistula tract. A lacrimal probe was placed through the tract and a seton was placed without difficulty. Hemostasis achieved. The draining seton was secured. The wound was irrigated and a sterile dressing applied. Pt tolerated well. Follow up instructions and pain medications given. Findings: long tract RAL external opening, transsphincteric with 75% muscle involvement, internal opening at post midline at puborectalis, scarring and severe engorged internal hemorrhoids    Estimated Blood Loss: Minimal    Specimens: * No specimens in log *     Drains: none    Implants: none    Complications: None; patient tolerated the procedure well.     Xiomara Thacker DO  10/3/2022  1:06 PM

## 2022-10-03 NOTE — PROGRESS NOTES
PACU FAMILY UPDATE    Called wife and discussed the findings and the conduct of the case as well as post-op course expectations. All questions were answered.

## 2022-11-23 ENCOUNTER — TRANSCRIBE ORDER (OUTPATIENT)
Dept: REGISTRATION | Age: 57
End: 2022-11-23

## 2022-11-23 ENCOUNTER — HOSPITAL ENCOUNTER (OUTPATIENT)
Dept: PREADMISSION TESTING | Age: 57
Discharge: HOME | End: 2022-11-23
Payer: OTHER GOVERNMENT

## 2022-11-23 DIAGNOSIS — K60.5 ANORECTAL FISTULA: ICD-10-CM

## 2022-11-23 DIAGNOSIS — K60.5 ANORECTAL FISTULA: Primary | ICD-10-CM

## 2022-11-23 LAB
HCT VFR BLD AUTO: 42.9 % (ref 36–48)
HGB BLD-MCNC: 15.2 G/DL (ref 13–16)

## 2022-11-23 PROCEDURE — 85018 HEMOGLOBIN: CPT

## 2022-11-23 PROCEDURE — 36415 COLL VENOUS BLD VENIPUNCTURE: CPT

## 2022-11-27 ENCOUNTER — HOSPITAL ENCOUNTER (EMERGENCY)
Age: 57
Discharge: HOME | End: 2022-11-27
Attending: STUDENT IN AN ORGANIZED HEALTH CARE EDUCATION/TRAINING PROGRAM
Payer: OTHER GOVERNMENT

## 2022-11-27 ENCOUNTER — APPOINTMENT (OUTPATIENT)
Dept: CT IMAGING | Age: 57
End: 2022-11-27
Attending: STUDENT IN AN ORGANIZED HEALTH CARE EDUCATION/TRAINING PROGRAM
Payer: OTHER GOVERNMENT

## 2022-11-27 VITALS
DIASTOLIC BLOOD PRESSURE: 76 MMHG | RESPIRATION RATE: 18 BRPM | HEART RATE: 68 BPM | BODY MASS INDEX: 43.32 KG/M2 | WEIGHT: 276 LBS | OXYGEN SATURATION: 98 % | HEIGHT: 67 IN | TEMPERATURE: 97 F | SYSTOLIC BLOOD PRESSURE: 136 MMHG

## 2022-11-27 DIAGNOSIS — N20.0 NEPHROLITHIASIS: Primary | ICD-10-CM

## 2022-11-27 DIAGNOSIS — R74.8 ELEVATED LIPASE: ICD-10-CM

## 2022-11-27 DIAGNOSIS — N13.30 HYDRONEPHROSIS OF RIGHT KIDNEY: ICD-10-CM

## 2022-11-27 LAB
ALBUMIN SERPL-MCNC: 4.2 G/DL (ref 3.4–5)
ALBUMIN/GLOB SERPL: 1.2 {RATIO} (ref 0.8–1.7)
ALP SERPL-CCNC: 87 U/L (ref 45–117)
ALT SERPL-CCNC: 164 U/L (ref 16–61)
ANION GAP SERPL CALC-SCNC: 7 MMOL/L (ref 3–18)
APPEARANCE UR: CLEAR
AST SERPL-CCNC: 60 U/L (ref 10–38)
BACTERIA URNS QL MICRO: NEGATIVE /HPF
BASOPHILS # BLD: 0.1 K/UL (ref 0–0.1)
BASOPHILS NFR BLD: 1 % (ref 0–2)
BILIRUB DIRECT SERPL-MCNC: 0.1 MG/DL (ref 0–0.2)
BILIRUB SERPL-MCNC: 0.4 MG/DL (ref 0.2–1)
BILIRUB UR QL: NEGATIVE
BUN SERPL-MCNC: 15 MG/DL (ref 7–18)
BUN/CREAT SERPL: 12 (ref 12–20)
CALCIUM SERPL-MCNC: 9.7 MG/DL (ref 8.5–10.1)
CHLORIDE SERPL-SCNC: 109 MMOL/L (ref 100–111)
CO2 SERPL-SCNC: 22 MMOL/L (ref 21–32)
COLOR UR: YELLOW
CREAT SERPL-MCNC: 1.3 MG/DL (ref 0.6–1.3)
DIFFERENTIAL METHOD BLD: ABNORMAL
EOSINOPHIL # BLD: 0.1 K/UL (ref 0–0.4)
EOSINOPHIL NFR BLD: 1 % (ref 0–5)
EPITH CASTS URNS QL MICRO: NORMAL /LPF (ref 0–5)
ERYTHROCYTE [DISTWIDTH] IN BLOOD BY AUTOMATED COUNT: 12 % (ref 11.6–14.5)
GLOBULIN SER CALC-MCNC: 3.5 G/DL (ref 2–4)
GLUCOSE SERPL-MCNC: 141 MG/DL (ref 74–99)
GLUCOSE UR STRIP.AUTO-MCNC: NEGATIVE MG/DL
HCT VFR BLD AUTO: 42.5 % (ref 36–48)
HGB BLD-MCNC: 14.5 G/DL (ref 13–16)
HGB UR QL STRIP: ABNORMAL
IMM GRANULOCYTES # BLD AUTO: 0 K/UL (ref 0–0.04)
IMM GRANULOCYTES NFR BLD AUTO: 1 % (ref 0–0.5)
KETONES UR QL STRIP.AUTO: NEGATIVE MG/DL
LEUKOCYTE ESTERASE UR QL STRIP.AUTO: NEGATIVE
LIPASE SERPL-CCNC: 454 U/L (ref 73–393)
LYMPHOCYTES # BLD: 1.7 K/UL (ref 0.9–3.6)
LYMPHOCYTES NFR BLD: 19 % (ref 21–52)
MCH RBC QN AUTO: 30.6 PG (ref 24–34)
MCHC RBC AUTO-ENTMCNC: 34.1 G/DL (ref 31–37)
MCV RBC AUTO: 89.7 FL (ref 78–100)
MONOCYTES # BLD: 0.8 K/UL (ref 0.05–1.2)
MONOCYTES NFR BLD: 9 % (ref 3–10)
NEUTS SEG # BLD: 6.1 K/UL (ref 1.8–8)
NEUTS SEG NFR BLD: 70 % (ref 40–73)
NITRITE UR QL STRIP.AUTO: NEGATIVE
NRBC # BLD: 0 K/UL (ref 0–0.01)
NRBC BLD-RTO: 0 PER 100 WBC
PH UR STRIP: 5.5 [PH] (ref 5–8)
PLATELET # BLD AUTO: 347 K/UL (ref 135–420)
PMV BLD AUTO: 10.5 FL (ref 9.2–11.8)
POTASSIUM SERPL-SCNC: 4.5 MMOL/L (ref 3.5–5.5)
PROT SERPL-MCNC: 7.7 G/DL (ref 6.4–8.2)
PROT UR STRIP-MCNC: NEGATIVE MG/DL
RBC # BLD AUTO: 4.74 M/UL (ref 4.35–5.65)
RBC #/AREA URNS HPF: NORMAL /HPF (ref 0–5)
SODIUM SERPL-SCNC: 138 MMOL/L (ref 136–145)
SP GR UR REFRACTOMETRY: >1.03 (ref 1–1.03)
UROBILINOGEN UR QL STRIP.AUTO: 0.2 EU/DL (ref 0.2–1)
WBC # BLD AUTO: 8.7 K/UL (ref 4.6–13.2)
WBC URNS QL MICRO: NORMAL /HPF (ref 0–5)

## 2022-11-27 PROCEDURE — 74011250637 HC RX REV CODE- 250/637: Performed by: STUDENT IN AN ORGANIZED HEALTH CARE EDUCATION/TRAINING PROGRAM

## 2022-11-27 PROCEDURE — 96374 THER/PROPH/DIAG INJ IV PUSH: CPT

## 2022-11-27 PROCEDURE — 74177 CT ABD & PELVIS W/CONTRAST: CPT

## 2022-11-27 PROCEDURE — 99285 EMERGENCY DEPT VISIT HI MDM: CPT

## 2022-11-27 PROCEDURE — 83690 ASSAY OF LIPASE: CPT

## 2022-11-27 PROCEDURE — 96375 TX/PRO/DX INJ NEW DRUG ADDON: CPT

## 2022-11-27 PROCEDURE — 74011000636 HC RX REV CODE- 636: Performed by: STUDENT IN AN ORGANIZED HEALTH CARE EDUCATION/TRAINING PROGRAM

## 2022-11-27 PROCEDURE — 81001 URINALYSIS AUTO W/SCOPE: CPT

## 2022-11-27 PROCEDURE — 80048 BASIC METABOLIC PNL TOTAL CA: CPT

## 2022-11-27 PROCEDURE — 80076 HEPATIC FUNCTION PANEL: CPT

## 2022-11-27 PROCEDURE — 74011250636 HC RX REV CODE- 250/636: Performed by: STUDENT IN AN ORGANIZED HEALTH CARE EDUCATION/TRAINING PROGRAM

## 2022-11-27 PROCEDURE — 85025 COMPLETE CBC W/AUTO DIFF WBC: CPT

## 2022-11-27 RX ORDER — TAMSULOSIN HYDROCHLORIDE 0.4 MG/1
0.4 CAPSULE ORAL ONCE
Status: COMPLETED | OUTPATIENT
Start: 2022-11-27 | End: 2022-11-27

## 2022-11-27 RX ORDER — OXYCODONE HYDROCHLORIDE 5 MG/1
5 TABLET ORAL
Qty: 12 TABLET | Refills: 0 | Status: SHIPPED | OUTPATIENT
Start: 2022-11-27 | End: 2022-11-30

## 2022-11-27 RX ORDER — HYDROCODONE BITARTRATE AND ACETAMINOPHEN 5; 325 MG/1; MG/1
1 TABLET ORAL
Status: COMPLETED | OUTPATIENT
Start: 2022-11-27 | End: 2022-11-27

## 2022-11-27 RX ORDER — NALOXONE HYDROCHLORIDE 4 MG/.1ML
SPRAY NASAL
Qty: 2 EACH | Refills: 0 | Status: SHIPPED | OUTPATIENT
Start: 2022-11-27

## 2022-11-27 RX ORDER — ONDANSETRON 4 MG/1
4 TABLET, FILM COATED ORAL
Qty: 12 TABLET | Refills: 0 | Status: SHIPPED | OUTPATIENT
Start: 2022-11-27

## 2022-11-27 RX ORDER — FENTANYL CITRATE 50 UG/ML
50 INJECTION, SOLUTION INTRAMUSCULAR; INTRAVENOUS
Status: COMPLETED | OUTPATIENT
Start: 2022-11-27 | End: 2022-11-27

## 2022-11-27 RX ORDER — ONDANSETRON 4 MG/1
4 TABLET, ORALLY DISINTEGRATING ORAL
Status: COMPLETED | OUTPATIENT
Start: 2022-11-27 | End: 2022-11-27

## 2022-11-27 RX ORDER — ONDANSETRON 2 MG/ML
4 INJECTION INTRAMUSCULAR; INTRAVENOUS
Status: COMPLETED | OUTPATIENT
Start: 2022-11-27 | End: 2022-11-27

## 2022-11-27 RX ORDER — KETOROLAC TROMETHAMINE 15 MG/ML
15 INJECTION, SOLUTION INTRAMUSCULAR; INTRAVENOUS ONCE
Status: COMPLETED | OUTPATIENT
Start: 2022-11-27 | End: 2022-11-27

## 2022-11-27 RX ADMIN — FENTANYL CITRATE 50 MCG: 50 INJECTION, SOLUTION INTRAMUSCULAR; INTRAVENOUS at 08:05

## 2022-11-27 RX ADMIN — SODIUM CHLORIDE, POTASSIUM CHLORIDE, SODIUM LACTATE AND CALCIUM CHLORIDE 1000 ML: 600; 310; 30; 20 INJECTION, SOLUTION INTRAVENOUS at 08:27

## 2022-11-27 RX ADMIN — TAMSULOSIN HYDROCHLORIDE 0.4 MG: 0.4 CAPSULE ORAL at 10:52

## 2022-11-27 RX ADMIN — KETOROLAC TROMETHAMINE 15 MG: 15 INJECTION, SOLUTION INTRAMUSCULAR; INTRAVENOUS at 08:04

## 2022-11-27 RX ADMIN — IOPAMIDOL 100 ML: 612 INJECTION, SOLUTION INTRAVENOUS at 09:07

## 2022-11-27 RX ADMIN — ONDANSETRON 4 MG: 4 TABLET, ORALLY DISINTEGRATING ORAL at 10:52

## 2022-11-27 RX ADMIN — ONDANSETRON 4 MG: 2 INJECTION INTRAMUSCULAR; INTRAVENOUS at 08:05

## 2022-11-27 RX ADMIN — HYDROCODONE BITARTRATE AND ACETAMINOPHEN 1 TABLET: 5; 325 TABLET ORAL at 10:52

## 2022-11-27 NOTE — Clinical Note
[facility] [place]  [facility] EMERGENCY DEPT  [address]  [facility] [id number] [phone]    Work/School Note    Date: 11/27/2022    To Whom It May concern:    Maira Calvillo was seen and treated today in the emergency room by the following provider(s):  Attending Provider: Raphael Roblero MD.      Maira Calvillo is excused from work/school on 11/27/22 and 11/28/22. He is medically clear to return to work/school on 11/29/2022.        Sincerely,          George Mckinney MD

## 2022-11-27 NOTE — DISCHARGE INSTRUCTIONS
Mild right-sided hydronephrosis and hydroureter is present. Mildly  delayed enhancement of the right renal parenchyma is present. Right perinephric  stranding is present. A small 2 mm distal obstructing right ureteral calculus is  present, located proximal to the ureterovesical junction. No additional calculi  are seen. Acetaminophen (Tylenol)   Take two 325 mg every 6 hours or two 500 mg tylenol every 8 hours as needed for pain/fever (do not take other tylenol containing products ). The ideal maximum dose is 3 grams per day, which would be 6 extra strength Tylenol (500 mg each). Dont take more than that if possible, therefore it is better to take 650 mg every 6 hours. Ibuprofen  For the treatment of mild to moderate pain, 400 mg of ibuprofen will work but doesn't help for inflammation   You can take 600mg every 6 hours. Generally taken every 6 to 8 hours, the maximum dose of Ibuprofen is 2400 mg per day which is 12 over-the-counter tablets. You can alternate tylenol and ibuprofen every 3 hours so that you are taking something for fever/pain every 3 hours.

## 2022-11-27 NOTE — ED PROVIDER NOTES
EMERGENCY DEPARTMENT HISTORY AND PHYSICAL EXAM  THE [facility] EMERGENCY DEPT        Date: 11/27/2022  Patient Name: Masood Edmond    History of Presenting Illness     Chief Complaint   Patient presents with    Abdominal Pain     History Provided By: Patient    HPI:  Masood Edmond is a 64 y.o. male with a history of anorectal fistula, cholecystectomy who presents today with abdominal pain. Began earlier this morning, right flank, right upper quadrant, right lateral.  Radiates from back and forth, denies any aggravating or alleviating factors. There was no associated setting/ trigger to which he can attribute the onset of his symptoms. The patient describes the pain as sharp. Severe pain    The patient has been nauseated and denies vomiting. The patient denies urinary complaints, and he does not have an extensive UTI history. The patient denies flank pain or fevers. Bowel movements have been normal otherwise. The patient denies melena or BRBPR. PCP: Lakisha Jeff MD    Current Outpatient Medications   Medication Sig Dispense Refill    ondansetron hcl (Zofran) 4 mg tablet Take 1 Tablet by mouth every eight (8) hours as needed for Nausea or Vomiting. 12 Tablet 0    oxyCODONE IR (Roxicodone) 5 mg immediate release tablet Take 1 Tablet by mouth every six (6) hours as needed for Pain for up to 3 days. Max Daily Amount: 20 mg. 12 Tablet 0    naloxone (NARCAN) 4 mg/actuation nasal spray Use 1 spray intranasally, then discard. Repeat with new spray every 2 min as needed for opioid overdose symptoms, alternating nostrils. 2 Each 0    diazePAM (VALIUM) 5 mg tablet Take 1 Tablet by mouth every six (6) hours as needed (For RECTAL SPASM). Max Daily Amount: 20 mg. 10 Tablet 0    b complex vitamins tablet Take 1 Tab by mouth daily.  cholecalciferol (VITAMIN D3) (5000 Units/125 mcg) tab tablet Take 5,000 Units by mouth daily.  vitamin E (AQUA GEMS) 400 unit capsule Take 400 Units by mouth daily.  glucosam-chondro-herb 149-hyal (GLUCOS CHOND CPLX ADVANCED PO) Take 1 Tab by mouth daily.  fish oil-dha-epa 1,200-144-216 mg cap Take 1 Tab by mouth daily.  SELENIUM PO Take 1 Tab by mouth daily.  magnesium oxide (MAG-OX) 400 mg tablet Take 400 mg by mouth daily.  zinc sulfate (ZINC-15 PO) Take 1 Tab by mouth daily.  TURMERIC PO Take 1 Tab by mouth daily.  co-enzyme Q-10 (CO Q-10) 100 mg capsule Take 100 mg by mouth daily. Past History     Past Medical History:  Past Medical History:   Diagnosis Date    Adverse effect of anesthesia     Liver disease     Fatty liver    Sleep apnea     uses bipap- instructed to bring dos       Past Surgical History:  Past Surgical History:   Procedure Laterality Date    COLONOSCOPY N/A 9/23/2022    COLONOSCOPY WITH  MAC; POLYPECTOMY performed by Sinai Herman DO at [facility] ENDOSCOPY    HX CHOLECYSTECTOMY  2021    HX HEENT      Lasik    HX OTHER SURGICAL      Chantelle-anal abcess drained    HX VASECTOMY  2000       Family History:  History reviewed. No pertinent family history. Social History:  Social History     Tobacco Use    Smoking status: Never    Smokeless tobacco: Never   Vaping Use    Vaping Use: Never used   Substance Use Topics    Alcohol use:  Yes     Alcohol/week: 2.0 standard drinks     Types: 2 Cans of beer per week     Comment: 1-2 beers weekly    Drug use: Never       Allergies:  No Known Allergies    Review of Systems   Review of Systems    In addition to that documented in the HPI above  All other review of systems negative    Constitutional: Denies fevers or chills  Eyes: Denies vision changes  ENMT: Denies sore throat  CV: Denies chest pain  Resp: Denies SOB  : Denies painful urination  MSK: Denies recent trauma  Skin: Denies new rashes  Neuro: Denies new numbness or tingling or weakness  Endocrine: Denies polyuria  Heme: Denies bleeding disorders    Physical Exam     Vitals:    11/27/22 0836 11/27/22 0098 11/27/22 0932 11/27/22 1133   BP: 136/70 139/67 (!) 142/81 136/76   Pulse:    68   Resp:    18   Temp:       SpO2: 97% 97% 98% 98%   Weight:       Height:         Physical Exam    Nursing notes and vital signs reviewed    General: Patient is awake and alert, appears uncomforable  Head: Normocephalic and atraumatic  Eyes: Extraocular muscles intact, no conjunctival pallor  Ear, nose, throat: Normal external exam, trachea midline  Neck: Normal range of motion, no gross motor difficulty  Cardiovascular: RRR,  warm, well-perfused extremities  Respiratory: Patient is in no respiratory distress, lungs CTAB  GI: soft, non-distended  MSK: No gross deformities appreciated, no lesions  Extremities: pulses intact with good cap refills, no LE pitting edema or calf tenderness  Skin: Warm, dry, and intact  Neuro: The patient is alert and oriented, no gross motor or sensory defects noted. Psych: Appropriate mood and affect. Medical Decision Making   I am the first provider for this patient. I reviewed the vital signs, available nursing notes, past medical history, past surgical history, family history and social history. Vital Signs-Reviewed the patient's vital signs. Visit Vitals  /76   Pulse 68   Temp 97 °F (36.1 °C)   Resp 18   Ht 5' 7\" (1.702 m)   Wt 125.2 kg (276 lb)   SpO2 98%   BMI 43.23 kg/m²     EKG interpretation: (Preliminary)  Interpreted by the EP. EKG non diagnostic, without acute ST elevation, arrhythmia, prolonged QT, or evidence of Brugada      Provider Notes (Medical Decision Making):   64 y.o., male, presents to the Emergency Department with abdominal pain.      DDx in this patient with abd pain includes Pancreatitis, Cholecystitis, Cholangitis, Hepatitis, Appendicitis, Diverticulitis, Colitis, Gastritis, PUD, Ureterolithiasis, SBO, Gastroparesis, Bowel Perforation, Hernia, Mesenteric Ischemia       Procedures:  Procedures    ED Course:   ED Course as of 11/27/22 1639   Sun Nov 27, 2022   9702 CBC without leukocytosis or anemia. [DM]   0934 Lipase mildly elevated    CBC without leukocytosis or anemia. No electrolyte abnormality on chemistry panel.   [DM]   1014 Mild right-sided hydronephrosis and hydroureter is present. Mildly  delayed enhancement of the right renal parenchyma is present. Right perinephric  stranding is present. A small 2 mm distal obstructing right ureteral calculus is  present, located proximal to the ureterovesical junction. No additional calculi  are seen. [DM]   1020 Urinalysis w/o signs of infection   [DM]   1038 Observe for PO challenge and pain control [DM]   1113 Feeling much better at this time. No acute pathology necessitating further emergent workup or hospital admission is suspected or found. Will discharge home with meds, zofran, simeon, follow up. He is comfortable with the plan and discharge at this time. Expressed the importance of follow up for current symptoms and he agrees and was advised on what signs/symptoms to return immediately to the ER. [DM]      ED Course User Index  [DM] Matt Watson MD        Diagnostic Study Results     Orders Placed This Encounter    CT ABD PELV W CONT     Standing Status:   Standing     Number of Occurrences:   1     Order Specific Question:   Transport     Answer:   BED [2]     Order Specific Question:   Reason for Exam     Answer:   flank and right upper quadrant pain     Order Specific Question: This order utilizes IV contrast.  What additional contrast is needed? Answer:   Per Radiologist Protocol     Order Specific Question:   Does patient have history of Renal Disease?      Answer:   No     Order Specific Question:   Decision Support Exception     Answer:   Emergency Medical Condition (MA) [1]    URINALYSIS W/ RFLX MICROSCOPIC     Standing Status:   Standing     Number of Occurrences:   1    CBC WITH AUTOMATED DIFF     Standing Status:   Standing     Number of Occurrences:   1    BASIC METABOLIC PANEL Standing Status:   Standing     Number of Occurrences:   1    HEPATIC FUNCTION PANEL     Standing Status:   Standing     Number of Occurrences:   1    LIPASE     Standing Status:   Standing     Number of Occurrences:   1    URINE MICROSCOPIC ONLY     Standing Status:   Standing     Number of Occurrences:   1    fentaNYL citrate (PF) injection 50 mcg    ondansetron (ZOFRAN) injection 4 mg    ketorolac (TORADOL) injection 15 mg    lactated ringers bolus infusion 1,000 mL    iopamidoL (ISOVUE 300) 61 % contrast injection 100 mL    tamsulosin (FLOMAX) capsule 0.4 mg    HYDROcodone-acetaminophen (NORCO) 5-325 mg per tablet 1 Tablet    ondansetron (ZOFRAN ODT) tablet 4 mg    ondansetron hcl (Zofran) 4 mg tablet     Sig: Take 1 Tablet by mouth every eight (8) hours as needed for Nausea or Vomiting. Dispense:  12 Tablet     Refill:  0    oxyCODONE IR (Roxicodone) 5 mg immediate release tablet     Sig: Take 1 Tablet by mouth every six (6) hours as needed for Pain for up to 3 days. Max Daily Amount: 20 mg. Dispense:  12 Tablet     Refill:  0    naloxone (NARCAN) 4 mg/actuation nasal spray     Sig: Use 1 spray intranasally, then discard. Repeat with new spray every 2 min as needed for opioid overdose symptoms, alternating nostrils.      Dispense:  2 Each     Refill:  0       Labs -     Recent Results (from the past 12 hour(s))   CBC WITH AUTOMATED DIFF    Collection Time: 11/27/22  8:18 AM   Result Value Ref Range    WBC 8.7 4.6 - 13.2 K/uL    RBC 4.74 4.35 - 5.65 M/uL    HGB 14.5 13.0 - 16.0 g/dL    HCT 42.5 36.0 - 48.0 %    MCV 89.7 78.0 - 100.0 FL    MCH 30.6 24.0 - 34.0 PG    MCHC 34.1 31.0 - 37.0 g/dL    RDW 12.0 11.6 - 14.5 %    PLATELET 920 362 - 465 K/uL    MPV 10.5 9.2 - 11.8 FL    NRBC 0.0 0  WBC    ABSOLUTE NRBC 0.00 0.00 - 0.01 K/uL    NEUTROPHILS 70 40 - 73 %    LYMPHOCYTES 19 (L) 21 - 52 %    MONOCYTES 9 3 - 10 %    EOSINOPHILS 1 0 - 5 %    BASOPHILS 1 0 - 2 %    IMMATURE GRANULOCYTES 1 (H) 0.0 - 0.5 %    ABS. NEUTROPHILS 6.1 1.8 - 8.0 K/UL    ABS. LYMPHOCYTES 1.7 0.9 - 3.6 K/UL    ABS. MONOCYTES 0.8 0.05 - 1.2 K/UL    ABS. EOSINOPHILS 0.1 0.0 - 0.4 K/UL    ABS. BASOPHILS 0.1 0.0 - 0.1 K/UL    ABS. IMM. GRANS. 0.0 0.00 - 0.04 K/UL    DF AUTOMATED     METABOLIC PANEL, BASIC    Collection Time: 11/27/22  8:18 AM   Result Value Ref Range    Sodium 138 136 - 145 mmol/L    Potassium 4.5 3.5 - 5.5 mmol/L    Chloride 109 100 - 111 mmol/L    CO2 22 21 - 32 mmol/L    Anion gap 7 3.0 - 18 mmol/L    Glucose 141 (H) 74 - 99 mg/dL    BUN 15 7.0 - 18 MG/DL    Creatinine 1.30 0.6 - 1.3 MG/DL    BUN/Creatinine ratio 12 12 - 20      eGFR >60 >60 ml/min/1.73m2    Calcium 9.7 8.5 - 10.1 MG/DL   HEPATIC FUNCTION PANEL    Collection Time: 11/27/22  8:18 AM   Result Value Ref Range    Protein, total 7.7 6.4 - 8.2 g/dL    Albumin 4.2 3.4 - 5.0 g/dL    Globulin 3.5 2.0 - 4.0 g/dL    A-G Ratio 1.2 0.8 - 1.7      Bilirubin, total 0.4 0.2 - 1.0 MG/DL    Bilirubin, direct 0.1 0.0 - 0.2 MG/DL    Alk. phosphatase 87 45 - 117 U/L    AST (SGOT) 60 (H) 10 - 38 U/L    ALT (SGPT) 164 (H) 16 - 61 U/L   LIPASE    Collection Time: 11/27/22  8:18 AM   Result Value Ref Range    Lipase 454 (H) 73 - 393 U/L   URINALYSIS W/ RFLX MICROSCOPIC    Collection Time: 11/27/22  9:46 AM   Result Value Ref Range    Color YELLOW      Appearance CLEAR      Specific gravity >1.030 (H) 1.003 - 1.030    pH (UA) 5.5 5.0 - 8.0      Protein Negative NEG mg/dL    Glucose Negative NEG mg/dL    Ketone Negative NEG mg/dL    Bilirubin Negative NEG      Blood TRACE (A) NEG      Urobilinogen 0.2 0.2 - 1.0 EU/dL    Nitrites Negative NEG      Leukocyte Esterase Negative NEG     URINE MICROSCOPIC ONLY    Collection Time: 11/27/22  9:46 AM   Result Value Ref Range    WBC 0 to 3 0 - 5 /hpf    RBC 0 to 3 0 - 5 /hpf    Epithelial cells FEW 0 - 5 /lpf    Bacteria Negative NEG /hpf       Radiologic Studies -   CT ABD PELV W CONT   Final Result      1.   Mild right-sided hydronephrosis and hydroureter produced by small distal   obstructing ureteral calculus. 2. Normal appendix. 3. Unilateral right-sided L5 pars defect without spondylolisthesis. CT Results  (Last 48 hours)                 11/27/22 0918  CT ABD PELV W CONT Final result    Impression:      1. Mild right-sided hydronephrosis and hydroureter produced by small distal   obstructing ureteral calculus. 2. Normal appendix. 3. Unilateral right-sided L5 pars defect without spondylolisthesis. Narrative:  EXAM: CT ABDOMEN AND PELVIS IV ONLY       INDICATION: Flank and right upper quadrant pain       COMPARISON: CT abdomen pelvis 2/13/2020       TECHNIQUE: Axial CT imaging of the abdomen and pelvis was performed after the   administration of only IV contrast as per specific clinician request.    Multiplanar reformats were generated. One or more dose reduction techniques   were used on this CT: automated exposure control, adjustment of the mAs and/or   kVp according to patient's size, and iterative reconstruction techniques. The   specific techniques utilized on this CT exam have been documented in the   patient's electronic medical record. Digital Imaging and Communications in   Medicine (DICOM) format image data are available to nonaffiliated external   healthcare facilities or entities on a secure, media free, reciprocally   searchable basis with patient authorization for at least a 12-month period after   this study. _______________       FINDINGS:       LOWER CHEST: The visualized lung bases are clear. There is no pericardial or   pleural effusion. LIVER, BILIARY: The liver has diffusely decreased attenuation consistent with   hepatic steatosis. Small discrete nodular enhancement is present along the   posterolateral aspect of the hepatic dome likely representing hemangioma, with   suggestion of low-level peripheral nodular enhancement previously.  No abnormal biliary dilation. Gallbladder is surgically absent. PANCREAS: Unremarkable. SPLEEN: Unremarkable. ADRENALS: Unremarkable. KIDNEYS: Mild right-sided hydronephrosis and hydroureter is present. Mildly   delayed enhancement of the right renal parenchyma is present. Right perinephric   stranding is present. A small 2 mm distal obstructing right ureteral calculus is   present, located proximal to the ureterovesical junction. No additional calculi   are seen. LYMPH NODES: No enlarged lymph nodes by size criteria. GASTROINTESTINAL TRACT: The lack of oral contrast limits bowel evaluation. No   abnormal bowel dilation or wall thickening. Appendix is within normal limits. PELVIC ORGANS: Unremarkable. VASCULATURE: Very mild atherosclerotic calcifications are present. OSSEOUS: Right-sided L5 pars defect is present. No spondylolisthesis is present. OTHER: None.       _______________                 CXR Results  (Last 48 hours)      None            Disposition     Disposition:  Home    CLINICAL IMPRESSION:    1. Nephrolithiasis    2. Hydronephrosis of right kidney    3.  Elevated lipase        It should be noted that I will be the provider of record for this patient  Ana M Koch MD    Follow-up Information       Follow up With Specialties Details Why [address]    [facility] EMERGENCY DEPT Emergency Medicine Go to  If symptoms worsen [address] Amanda Calvillo [address] [facility]    Mare Felty, MD Family Medicine   [address] [place] [id number] [phone]      Omega Zaldivar MD Urology Call in 1 day for follow up [address] [facility]  [phone]              Discharge Medication List as of 11/27/2022 11:13 AM        START taking these medications    Details   ondansetron hcl (Zofran) 4 mg tablet Take 1 Tablet by mouth every eight (8) hours as needed for Nausea or Vomiting., Normal, Disp-12 Tablet, R-0      oxyCODONE IR (Roxicodone) 5 mg immediate release tablet Take 1 Tablet by mouth every six (6) hours as needed for Pain for up to 3 days. Max Daily Amount: 20 mg., Normal, Disp-12 Tablet, R-0      naloxone (NARCAN) 4 mg/actuation nasal spray Use 1 spray intranasally, then discard. Repeat with new spray every 2 min as needed for opioid overdose symptoms, alternating nostrils. , Normal, Disp-2 Each, R-0           CONTINUE these medications which have NOT CHANGED    Details   diazePAM (VALIUM) 5 mg tablet Take 1 Tablet by mouth every six (6) hours as needed (For RECTAL SPASM). Max Daily Amount: 20 mg., Print, Disp-10 Tablet, R-0      b complex vitamins tablet Take 1 Tab by mouth daily. , Historical Med      cholecalciferol (VITAMIN D3) (5000 Units/125 mcg) tab tablet Take 5,000 Units by mouth daily. , Historical Med      vitamin E (AQUA GEMS) 400 unit capsule Take 400 Units by mouth daily. , Historical Med      glucosam-chondro-herb 149-hyal (GLUCOS CHOND CPLX ADVANCED PO) Take 1 Tab by mouth daily. , Historical Med      fish oil-dha-epa 1,200-144-216 mg cap Take 1 Tab by mouth daily. , Historical Med      SELENIUM PO Take 1 Tab by mouth daily. , Historical Med      magnesium oxide (MAG-OX) 400 mg tablet Take 400 mg by mouth daily. , Historical Med      zinc sulfate (ZINC-15 PO) Take 1 Tab by mouth daily. , Historical Med      TURMERIC PO Take 1 Tab by mouth daily. , Historical Med      co-enzyme Q-10 (CO Q-10) 100 mg capsule Take 100 mg by mouth daily. , Historical Med             Please note that this dictation was completed with [facility], the [employer] voice recognition software. Quite often unanticipated grammatical, syntax, homophones, and other interpretive errors are inadvertently transcribed by the computer software. Please disregard these errors. Please excuse any errors that have escaped final proofreading.

## 2022-11-27 NOTE — ED TRIAGE NOTES
Woke up this morning and began having right upper abd pain that radiates to his right side of back;  some nausea;  denies diarrhea; fever  Sharp pain;  denies blood in urine or stool; had normal bowel movement this am

## 2022-12-01 ENCOUNTER — HOSPITAL ENCOUNTER (EMERGENCY)
Age: 57
Discharge: HOME | End: 2022-12-01
Attending: EMERGENCY MEDICINE
Payer: OTHER GOVERNMENT

## 2022-12-01 ENCOUNTER — APPOINTMENT (OUTPATIENT)
Dept: GENERAL RADIOLOGY | Age: 57
End: 2022-12-01
Attending: PHYSICIAN ASSISTANT
Payer: OTHER GOVERNMENT

## 2022-12-01 VITALS
HEIGHT: 67 IN | SYSTOLIC BLOOD PRESSURE: 164 MMHG | WEIGHT: 276 LBS | DIASTOLIC BLOOD PRESSURE: 95 MMHG | OXYGEN SATURATION: 100 % | HEART RATE: 66 BPM | TEMPERATURE: 98 F | BODY MASS INDEX: 43.32 KG/M2 | RESPIRATION RATE: 16 BRPM

## 2022-12-01 DIAGNOSIS — N23 RENAL COLIC: Primary | ICD-10-CM

## 2022-12-01 DIAGNOSIS — N20.0 KIDNEY STONE: ICD-10-CM

## 2022-12-01 LAB
ALBUMIN SERPL-MCNC: 3.8 G/DL (ref 3.4–5)
ALBUMIN/GLOB SERPL: 1.1 {RATIO} (ref 0.8–1.7)
ALP SERPL-CCNC: 82 U/L (ref 45–117)
ALT SERPL-CCNC: 121 U/L (ref 16–61)
ANION GAP SERPL CALC-SCNC: 7 MMOL/L (ref 3–18)
APPEARANCE UR: CLEAR
AST SERPL-CCNC: 47 U/L (ref 10–38)
BACTERIA URNS QL MICRO: ABNORMAL /HPF
BASOPHILS # BLD: 0.1 K/UL (ref 0–0.1)
BASOPHILS NFR BLD: 0 % (ref 0–2)
BILIRUB SERPL-MCNC: 0.3 MG/DL (ref 0.2–1)
BILIRUB UR QL: NEGATIVE
BUN SERPL-MCNC: 17 MG/DL (ref 7–18)
BUN/CREAT SERPL: 13 (ref 12–20)
CALCIUM SERPL-MCNC: 9.4 MG/DL (ref 8.5–10.1)
CHLORIDE SERPL-SCNC: 107 MMOL/L (ref 100–111)
CO2 SERPL-SCNC: 24 MMOL/L (ref 21–32)
COLOR UR: YELLOW
CREAT SERPL-MCNC: 1.34 MG/DL (ref 0.6–1.3)
DIFFERENTIAL METHOD BLD: ABNORMAL
EOSINOPHIL # BLD: 0.2 K/UL (ref 0–0.4)
EOSINOPHIL NFR BLD: 1 % (ref 0–5)
EPITH CASTS URNS QL MICRO: ABNORMAL /LPF (ref 0–5)
ERYTHROCYTE [DISTWIDTH] IN BLOOD BY AUTOMATED COUNT: 12 % (ref 11.6–14.5)
GLOBULIN SER CALC-MCNC: 3.5 G/DL (ref 2–4)
GLUCOSE SERPL-MCNC: 124 MG/DL (ref 74–99)
GLUCOSE UR STRIP.AUTO-MCNC: NEGATIVE MG/DL
HCT VFR BLD AUTO: 39.4 % (ref 36–48)
HGB BLD-MCNC: 13.7 G/DL (ref 13–16)
HGB UR QL STRIP: ABNORMAL
HYALINE CASTS URNS QL MICRO: ABNORMAL /LPF (ref 0–2)
IMM GRANULOCYTES # BLD AUTO: 0 K/UL (ref 0–0.04)
IMM GRANULOCYTES NFR BLD AUTO: 0 % (ref 0–0.5)
KETONES UR QL STRIP.AUTO: ABNORMAL MG/DL
LEUKOCYTE ESTERASE UR QL STRIP.AUTO: NEGATIVE
LIPASE SERPL-CCNC: 121 U/L (ref 73–393)
LYMPHOCYTES # BLD: 1.5 K/UL (ref 0.9–3.6)
LYMPHOCYTES NFR BLD: 13 % (ref 21–52)
MCH RBC QN AUTO: 30.9 PG (ref 24–34)
MCHC RBC AUTO-ENTMCNC: 34.8 G/DL (ref 31–37)
MCV RBC AUTO: 88.9 FL (ref 78–100)
MONOCYTES # BLD: 1.2 K/UL (ref 0.05–1.2)
MONOCYTES NFR BLD: 11 % (ref 3–10)
MUCOUS THREADS URNS QL MICRO: ABNORMAL /LPF
NEUTS SEG # BLD: 8.3 K/UL (ref 1.8–8)
NEUTS SEG NFR BLD: 74 % (ref 40–73)
NITRITE UR QL STRIP.AUTO: NEGATIVE
NRBC # BLD: 0 K/UL (ref 0–0.01)
NRBC BLD-RTO: 0 PER 100 WBC
PH UR STRIP: 5.5 [PH] (ref 5–8)
PLATELET # BLD AUTO: 298 K/UL (ref 135–420)
PMV BLD AUTO: 10.7 FL (ref 9.2–11.8)
POTASSIUM SERPL-SCNC: 3.9 MMOL/L (ref 3.5–5.5)
PROT SERPL-MCNC: 7.3 G/DL (ref 6.4–8.2)
PROT UR STRIP-MCNC: NEGATIVE MG/DL
RBC # BLD AUTO: 4.43 M/UL (ref 4.35–5.65)
RBC #/AREA URNS HPF: ABNORMAL /HPF (ref 0–5)
SODIUM SERPL-SCNC: 138 MMOL/L (ref 136–145)
SP GR UR REFRACTOMETRY: 1.02 (ref 1–1.03)
UROBILINOGEN UR QL STRIP.AUTO: 0.2 EU/DL (ref 0.2–1)
WBC # BLD AUTO: 11.2 K/UL (ref 4.6–13.2)
WBC URNS QL MICRO: ABNORMAL /HPF (ref 0–5)

## 2022-12-01 PROCEDURE — 85025 COMPLETE CBC W/AUTO DIFF WBC: CPT

## 2022-12-01 PROCEDURE — 96374 THER/PROPH/DIAG INJ IV PUSH: CPT

## 2022-12-01 PROCEDURE — 96375 TX/PRO/DX INJ NEW DRUG ADDON: CPT

## 2022-12-01 PROCEDURE — 99284 EMERGENCY DEPT VISIT MOD MDM: CPT

## 2022-12-01 PROCEDURE — 80053 COMPREHEN METABOLIC PANEL: CPT

## 2022-12-01 PROCEDURE — 74018 RADEX ABDOMEN 1 VIEW: CPT

## 2022-12-01 PROCEDURE — 81001 URINALYSIS AUTO W/SCOPE: CPT

## 2022-12-01 PROCEDURE — 74011250636 HC RX REV CODE- 250/636: Performed by: PHYSICIAN ASSISTANT

## 2022-12-01 PROCEDURE — 83690 ASSAY OF LIPASE: CPT

## 2022-12-01 RX ORDER — TAMSULOSIN HYDROCHLORIDE 0.4 MG/1
0.4 CAPSULE ORAL DAILY
Qty: 14 CAPSULE | Refills: 0 | Status: SHIPPED | OUTPATIENT
Start: 2022-12-01 | End: 2022-12-15

## 2022-12-01 RX ORDER — KETOROLAC TROMETHAMINE 15 MG/ML
15 INJECTION, SOLUTION INTRAMUSCULAR; INTRAVENOUS
Status: COMPLETED | OUTPATIENT
Start: 2022-12-01 | End: 2022-12-01

## 2022-12-01 RX ORDER — ONDANSETRON 2 MG/ML
4 INJECTION INTRAMUSCULAR; INTRAVENOUS
Status: COMPLETED | OUTPATIENT
Start: 2022-12-01 | End: 2022-12-01

## 2022-12-01 RX ORDER — KETOROLAC TROMETHAMINE 10 MG/1
10 TABLET, FILM COATED ORAL
Qty: 12 TABLET | Refills: 0 | Status: SHIPPED | OUTPATIENT
Start: 2022-12-01

## 2022-12-01 RX ADMIN — KETOROLAC TROMETHAMINE 15 MG: 15 INJECTION, SOLUTION INTRAMUSCULAR; INTRAVENOUS at 09:13

## 2022-12-01 RX ADMIN — ONDANSETRON 4 MG: 2 INJECTION INTRAMUSCULAR; INTRAVENOUS at 09:13

## 2022-12-01 NOTE — ED TRIAGE NOTES
Patient reports he had a kidney stone on Sunday and last night the pain came back to right flank and abdominal pain

## 2022-12-01 NOTE — ED PROVIDER NOTES
EMERGENCY DEPARTMENT HISTORY AND PHYSICAL EXAM    Date: 12/1/2022  Patient Name: Woodrow Arias    History of Presenting Illness     Chief Complaint   Patient presents with    Flank Pain    Abdominal Pain         History Provided By: Patient    8:11 AM  Woodrow Arias is a 64 y.o. male with PMHX of fatty liver disease, sleep apnea who presents to the emergency department C/O right lower quadrant pain which radiates to his right flank which returned again early this morning. Patient was seen here 4 days ago for same pain had CT that showed a 2 mm right distal ureter. Was feeling better overall until early this morning when pain returned with associated nausea. Patient is also expecting to have a fistulotomy of anorectal fistula by Dr Mackenzie Ureña this coming week. Pt denies fever, diarrhea, constipation, dysuria, hematuria, prior kidney stones, and any other sxs or complaints. PCP: Ze Montez MD    Current Outpatient Medications   Medication Sig Dispense Refill    tamsulosin (Flomax) 0.4 mg capsule Take 1 Capsule by mouth daily for 14 days. 14 Capsule 0    ketorolac (TORADOL) 10 mg tablet Take 1 Tablet by mouth every six (6) hours as needed for Pain. 12 Tablet 0    ondansetron hcl (Zofran) 4 mg tablet Take 1 Tablet by mouth every eight (8) hours as needed for Nausea or Vomiting. 12 Tablet 0    naloxone (NARCAN) 4 mg/actuation nasal spray Use 1 spray intranasally, then discard. Repeat with new spray every 2 min as needed for opioid overdose symptoms, alternating nostrils. 2 Each 0    diazePAM (VALIUM) 5 mg tablet Take 1 Tablet by mouth every six (6) hours as needed (For RECTAL SPASM). Max Daily Amount: 20 mg. 10 Tablet 0    b complex vitamins tablet Take 1 Tab by mouth daily. cholecalciferol (VITAMIN D3) (5000 Units/125 mcg) tab tablet Take 5,000 Units by mouth daily. vitamin E (AQUA GEMS) 400 unit capsule Take 400 Units by mouth daily.       glucosam-chondro-herb 149-hyal (GLUCOS CHOND CPLX ADVANCED PO) Take 1 Tab by mouth daily. fish oil-dha-epa 1,200-144-216 mg cap Take 1 Tab by mouth daily. SELENIUM PO Take 1 Tab by mouth daily. magnesium oxide (MAG-OX) 400 mg tablet Take 400 mg by mouth daily. zinc sulfate (ZINC-15 PO) Take 1 Tab by mouth daily. TURMERIC PO Take 1 Tab by mouth daily. co-enzyme Q-10 (CO Q-10) 100 mg capsule Take 100 mg by mouth daily. Past History     Past Medical History:  Past Medical History:   Diagnosis Date    Adverse effect of anesthesia     Kidney stones     Liver disease     Fatty liver    Sleep apnea     uses bipap- instructed to bring dos       Past Surgical History:  Past Surgical History:   Procedure Laterality Date    COLONOSCOPY N/A 9/23/2022    COLONOSCOPY WITH  MAC; POLYPECTOMY performed by Sandie Simmons DO at THE [facility] ENDOSCOPY    HX CHOLECYSTECTOMY  2021    HX HEENT      Lasik    HX OTHER SURGICAL      Chantelle-anal abcess drained    HX VASECTOMY  2000       Family History:  History reviewed. No pertinent family history. Social History:  Social History     Tobacco Use    Smoking status: Never    Smokeless tobacco: Never   Vaping Use    Vaping Use: Never used   Substance Use Topics    Alcohol use: Yes     Alcohol/week: 2.0 standard drinks     Types: 2 Cans of beer per week     Comment: 1-2 beers weekly    Drug use: Never       Allergies:  No Known Allergies      Review of Systems   Review of Systems   Constitutional: Negative. Gastrointestinal:  Positive for abdominal pain and nausea. Negative for constipation, diarrhea and vomiting. Genitourinary:  Positive for flank pain. Negative for dysuria and hematuria. All other systems reviewed and are negative. Physical Exam     Vitals:    12/01/22 0716   BP: (!) 164/95   Pulse: 66   Resp: 16   Temp: 98 °F (36.7 °C)   SpO2: 100%   Weight: 125.2 kg (276 lb)   Height: 5' 7\" (1.702 m)     Physical Exam  Vital signs and nursing notes reviewed. CONSTITUTIONAL: Alert. Well-appearing; well-nourished; in no apparent distress. HEAD: Normocephalic; atraumatic. EYES:   Conjunctiva clear. CV: Normal S1, S2; no murmurs, rubs, or gallops. No chest wall tenderness. RESPIRATORY: Normal chest excursion with respiration; breath sounds clear and equal bilaterally; no wheezes, rhonchi, or rales. GI: Normal bowel sounds; non-distended; +mild RLQ tenderness; no guarding or rigidity; no palpable organomegaly. No CVA tenderness. BACK:  No evidence of trauma or deformity. Non-tender to palpation. FROM without difficulty. Negative straight leg raise bilaterally. EXT: Normal ROM in all four extremities; non-tender to palpation. SKIN: Normal for age and race; warm; dry; good turgor; no apparent lesions or exudate. NEURO: A & O x3. PSYCH:  Mood and affect appropriate. Diagnostic Study Results     Labs -     No results found for this or any previous visit (from the past 12 hour(s)). Radiologic Studies -   XR ABD (KUB)   Final Result      Nonobstructing bowel gas pattern. CT Results  (Last 48 hours)      None          CXR Results  (Last 48 hours)      None            Medications given in the ED-  Medications   ketorolac (TORADOL) injection 15 mg (15 mg IntraVENous Given 12/1/22 0913)   ondansetron (ZOFRAN) injection 4 mg (4 mg IntraVENous Given 12/1/22 0913)         Medical Decision Making   I am the first provider for this patient. I reviewed the vital signs, available nursing notes, past medical history, past surgical history, family history and social history. Vital Signs-Reviewed the patient's vital signs. Records Reviewed: Nursing Notes and Old Medical Records      Procedures:  Procedures    ED Course:  8:11 AM   Initial assessment performed. The patients presenting problems have been discussed, and they are in agreement with the care plan formulated and outlined with them.   I have encouraged them to ask questions as they arise throughout their visit. 10:10 AM progress note  Patient reports significant improvement of pain, down to a 3/10 and he is comfortable. His labs grossly unremarkable. Review of CT from a few days ago shows a mildly obstructive 2 mm stone in the right distal ureter which likely has not passed at this time but should with further time, Flomax and pain medication. No signs of secondary infection and he is comfortable now. Will discharge home, encourage plenty of fluids, add Flomax and Toradol, has Zofran and oxycodone left from recent visit. Patient also provided strainer to monitor for passing of the stone in ED return precautions such as worsening pain, unable to tolerate p.o. or fever discussed. Patient comfortable with plan. Urologist recommendation also provided. Diagnosis and Disposition       DISCHARGE NOTE:    Lori Dejah Presley's  results have been reviewed with him. He has been counseled regarding his diagnosis, treatment, and plan. He verbally conveys understanding and agreement of the signs, symptoms, diagnosis, treatment and prognosis and additionally agrees to follow up as discussed. He also agrees with the care-plan and conveys that all of his questions have been answered. I have also provided discharge instructions for him that include: educational information regarding their diagnosis and treatment, and list of reasons why they would want to return to the ED prior to their follow-up appointment, should his condition change. He has been provided with education for proper emergency department utilization. CLINICAL IMPRESSION:    1. Renal colic    2. Kidney stone        PLAN:  1. D/C Home  2. Discharge Medication List as of 12/1/2022 10:20 AM        START taking these medications    Details   tamsulosin (Flomax) 0.4 mg capsule Take 1 Capsule by mouth daily for 14 days. , Normal, Disp-14 Capsule, R-0      ketorolac (TORADOL) 10 mg tablet Take 1 Tablet by mouth every six (6) hours as needed for Pain., Normal, Disp-12 Tablet, R-0           CONTINUE these medications which have NOT CHANGED    Details   ondansetron hcl (Zofran) 4 mg tablet Take 1 Tablet by mouth every eight (8) hours as needed for Nausea or Vomiting., Normal, Disp-12 Tablet, R-0      naloxone (NARCAN) 4 mg/actuation nasal spray Use 1 spray intranasally, then discard. Repeat with new spray every 2 min as needed for opioid overdose symptoms, alternating nostrils. , Normal, Disp-2 Each, R-0      diazePAM (VALIUM) 5 mg tablet Take 1 Tablet by mouth every six (6) hours as needed (For RECTAL SPASM). Max Daily Amount: 20 mg., Print, Disp-10 Tablet, R-0      b complex vitamins tablet Take 1 Tab by mouth daily. , Historical Med      cholecalciferol (VITAMIN D3) (5000 Units/125 mcg) tab tablet Take 5,000 Units by mouth daily. , Historical Med      vitamin E (AQUA GEMS) 400 unit capsule Take 400 Units by mouth daily. , Historical Med      glucosam-chondro-herb 149-hyal (GLUCOS CHOND CPLX ADVANCED PO) Take 1 Tab by mouth daily. , Historical Med      fish oil-dha-epa 1,200-144-216 mg cap Take 1 Tab by mouth daily. , Historical Med      SELENIUM PO Take 1 Tab by mouth daily. , Historical Med      magnesium oxide (MAG-OX) 400 mg tablet Take 400 mg by mouth daily. , Historical Med      zinc sulfate (ZINC-15 PO) Take 1 Tab by mouth daily. , Historical Med      TURMERIC PO Take 1 Tab by mouth daily. , Historical Med      co-enzyme Q-10 (CO Q-10) 100 mg capsule Take 100 mg by mouth daily. , Historical Med           STOP taking these medications       oxyCODONE IR (Roxicodone) 5 mg immediate release tablet Comments:   Reason for Stopping:             3.   Follow-up Information       Follow up With Specialties Details Why Lázaro Rasmussen MD Urology Schedule an appointment as soon as possible for a visit   [facility] EMERGENCY DEPT Emergency Medicine  As needed, If symptoms worsen [address] [place] [id number]  [phone]          _______________________________      Please note that this dictation was completed with [employer], the computer voice recognition software. Quite often unanticipated grammatical, syntax, homophones, and other interpretive errors are inadvertently transcribed by the computer software. Please disregard these errors. Please excuse any errors that have escaped final proofreading.

## 2022-12-02 ENCOUNTER — ANESTHESIA EVENT (OUTPATIENT)
Dept: SURGERY | Age: 57
End: 2022-12-02
Payer: OTHER GOVERNMENT

## 2022-12-02 ENCOUNTER — HOSPITAL ENCOUNTER (OUTPATIENT)
Dept: PREADMISSION TESTING | Age: 57
Discharge: HOME | End: 2022-12-02

## 2022-12-02 ENCOUNTER — HOSPITAL ENCOUNTER (OUTPATIENT)
Dept: PREADMISSION TESTING | Age: 57
End: 2022-12-02
Payer: OTHER GOVERNMENT

## 2022-12-02 PROCEDURE — 93005 ELECTROCARDIOGRAM TRACING: CPT

## 2022-12-02 RX ORDER — SODIUM CHLORIDE, SODIUM LACTATE, POTASSIUM CHLORIDE, CALCIUM CHLORIDE 600; 310; 30; 20 MG/100ML; MG/100ML; MG/100ML; MG/100ML
125 INJECTION, SOLUTION INTRAVENOUS CONTINUOUS
Status: CANCELLED | OUTPATIENT
Start: 2022-12-02

## 2022-12-02 NOTE — PERIOP NOTES
Leave all valuables at home or loved ones;to include wallets/purse, money/credit cards, electronics  such as laptops and tablets. If you want to have your prescriptions filled here, please have some form of payment with your . Please arrange for your transportation home  Hold supplements 2 weeks prior to [place]. Denies any prosthetics. Patient states family physician is aware of upcoming procedure/surgery. Stop nsaids 7 days prior to [place]. Do not put any lotion, jewelry, makeup, fingernail or toenail polish; no wigs, no private piercings; no tictac,gum and mouthwash. Patient instructed to bring cpap machine in on day of procedure/surgery. Denies family history of anesthesia complications. Denies shortness of breath nor chest pain while climbing stairs. Please be aware that due to unforeseen circumstances, delays may occur and your patience will be appreciated. If you ae scheduled to be discharged the same day, please plan to be with us for most of the day. If an inpatient, room assignments may be delayed as well. Our priority is to make you as comfortable as possible and to keep your family informed of your status when possible.   August

## 2022-12-03 LAB
ATRIAL RATE: 63 BPM
CALCULATED P AXIS, ECG09: 60 DEGREES
CALCULATED R AXIS, ECG10: 56 DEGREES
CALCULATED T AXIS, ECG11: 33 DEGREES
DIAGNOSIS, 93000: NORMAL
P-R INTERVAL, ECG05: 158 MS
Q-T INTERVAL, ECG07: 410 MS
QRS DURATION, ECG06: 84 MS
QTC CALCULATION (BEZET), ECG08: 419 MS
VENTRICULAR RATE, ECG03: 63 BPM

## 2022-12-05 ENCOUNTER — ANESTHESIA (OUTPATIENT)
Dept: SURGERY | Age: 57
End: 2022-12-05
Payer: OTHER GOVERNMENT

## 2022-12-05 ENCOUNTER — HOSPITAL ENCOUNTER (OUTPATIENT)
Age: 57
Setting detail: OUTPATIENT SURGERY
Discharge: HOME | End: 2022-12-05
Attending: SURGERY | Admitting: SURGERY
Payer: OTHER GOVERNMENT

## 2022-12-05 VITALS
OXYGEN SATURATION: 97 % | HEART RATE: 78 BPM | WEIGHT: 276.8 LBS | HEIGHT: 67 IN | BODY MASS INDEX: 43.44 KG/M2 | DIASTOLIC BLOOD PRESSURE: 99 MMHG | RESPIRATION RATE: 20 BRPM | TEMPERATURE: 98 F | SYSTOLIC BLOOD PRESSURE: 153 MMHG

## 2022-12-05 DIAGNOSIS — K60.5 ANORECTAL FISTULA: Primary | ICD-10-CM

## 2022-12-05 PROCEDURE — 77030020782 HC GWN BAIR PAWS FLX 3M -B: Performed by: SURGERY

## 2022-12-05 PROCEDURE — 74011250636 HC RX REV CODE- 250/636: Performed by: NURSE ANESTHETIST, CERTIFIED REGISTERED

## 2022-12-05 PROCEDURE — 74011000250 HC RX REV CODE- 250: Performed by: NURSE ANESTHETIST, CERTIFIED REGISTERED

## 2022-12-05 PROCEDURE — 74011250636 HC RX REV CODE- 250/636: Performed by: SURGERY

## 2022-12-05 PROCEDURE — 77030020268 HC MISC GENERAL SUPPLY: Performed by: SURGERY

## 2022-12-05 PROCEDURE — 74011000272 HC RX REV CODE- 272: Performed by: SURGERY

## 2022-12-05 PROCEDURE — 77030002996 HC SUT SLK J&J -A: Performed by: SURGERY

## 2022-12-05 PROCEDURE — 77030031139 HC SUT VCRL2 J&J -A: Performed by: SURGERY

## 2022-12-05 PROCEDURE — 74011000250 HC RX REV CODE- 250: Performed by: SURGERY

## 2022-12-05 PROCEDURE — 74011250636 HC RX REV CODE- 250/636: Performed by: SPECIALIST

## 2022-12-05 PROCEDURE — 77030031140 HC SUT VCRL3 J&J -A: Performed by: SURGERY

## 2022-12-05 PROCEDURE — 76010000149 HC OR TIME 1 TO 1.5 HR: Performed by: SURGERY

## 2022-12-05 PROCEDURE — 2709999900 HC NON-CHARGEABLE SUPPLY: Performed by: SURGERY

## 2022-12-05 PROCEDURE — 77030040361 HC SLV COMPR DVT MDII -B: Performed by: SURGERY

## 2022-12-05 PROCEDURE — 74011250637 HC RX REV CODE- 250/637: Performed by: SURGERY

## 2022-12-05 PROCEDURE — 76210000026 HC REC RM PH II 1 TO 1.5 HR: Performed by: SURGERY

## 2022-12-05 PROCEDURE — 76060000033 HC ANESTHESIA 1 TO 1.5 HR: Performed by: SURGERY

## 2022-12-05 PROCEDURE — 76210000006 HC OR PH I REC 0.5 TO 1 HR: Performed by: SURGERY

## 2022-12-05 RX ORDER — SODIUM CHLORIDE, SODIUM LACTATE, POTASSIUM CHLORIDE, CALCIUM CHLORIDE 600; 310; 30; 20 MG/100ML; MG/100ML; MG/100ML; MG/100ML
125 INJECTION, SOLUTION INTRAVENOUS CONTINUOUS
Status: DISCONTINUED | OUTPATIENT
Start: 2022-12-05 | End: 2022-12-05 | Stop reason: HOSPADM

## 2022-12-05 RX ORDER — SODIUM CHLORIDE 9 MG/ML
125 INJECTION, SOLUTION INTRAVENOUS CONTINUOUS
Status: DISCONTINUED | OUTPATIENT
Start: 2022-12-05 | End: 2022-12-05 | Stop reason: HOSPADM

## 2022-12-05 RX ORDER — DEXTROSE MONOHYDRATE 100 MG/ML
0-250 INJECTION, SOLUTION INTRAVENOUS AS NEEDED
Status: DISCONTINUED | OUTPATIENT
Start: 2022-12-05 | End: 2022-12-05 | Stop reason: HOSPADM

## 2022-12-05 RX ORDER — FENTANYL CITRATE 50 UG/ML
25 INJECTION, SOLUTION INTRAMUSCULAR; INTRAVENOUS AS NEEDED
Status: DISCONTINUED | OUTPATIENT
Start: 2022-12-05 | End: 2022-12-05 | Stop reason: HOSPADM

## 2022-12-05 RX ORDER — IBUPROFEN 200 MG
4 TABLET ORAL AS NEEDED
Status: DISCONTINUED | OUTPATIENT
Start: 2022-12-05 | End: 2022-12-05 | Stop reason: HOSPADM

## 2022-12-05 RX ORDER — LIDOCAINE HYDROCHLORIDE 20 MG/ML
JELLY TOPICAL AS NEEDED
Status: DISCONTINUED | OUTPATIENT
Start: 2022-12-05 | End: 2022-12-05 | Stop reason: HOSPADM

## 2022-12-05 RX ORDER — LIDOCAINE HYDROCHLORIDE 20 MG/ML
INJECTION, SOLUTION EPIDURAL; INFILTRATION; INTRACAUDAL; PERINEURAL AS NEEDED
Status: DISCONTINUED | OUTPATIENT
Start: 2022-12-05 | End: 2022-12-05 | Stop reason: HOSPADM

## 2022-12-05 RX ORDER — FENTANYL CITRATE 50 UG/ML
50 INJECTION, SOLUTION INTRAMUSCULAR; INTRAVENOUS
Status: DISCONTINUED | OUTPATIENT
Start: 2022-12-05 | End: 2022-12-05 | Stop reason: HOSPADM

## 2022-12-05 RX ORDER — SUCCINYLCHOLINE CHLORIDE 100 MG/5ML
SYRINGE (ML) INTRAVENOUS AS NEEDED
Status: DISCONTINUED | OUTPATIENT
Start: 2022-12-05 | End: 2022-12-05 | Stop reason: HOSPADM

## 2022-12-05 RX ORDER — MIDAZOLAM HYDROCHLORIDE 1 MG/ML
INJECTION, SOLUTION INTRAMUSCULAR; INTRAVENOUS AS NEEDED
Status: DISCONTINUED | OUTPATIENT
Start: 2022-12-05 | End: 2022-12-05 | Stop reason: HOSPADM

## 2022-12-05 RX ORDER — ONDANSETRON 2 MG/ML
4 INJECTION INTRAMUSCULAR; INTRAVENOUS ONCE
Status: DISCONTINUED | OUTPATIENT
Start: 2022-12-05 | End: 2022-12-05 | Stop reason: HOSPADM

## 2022-12-05 RX ORDER — ONDANSETRON 2 MG/ML
INJECTION INTRAMUSCULAR; INTRAVENOUS AS NEEDED
Status: DISCONTINUED | OUTPATIENT
Start: 2022-12-05 | End: 2022-12-05 | Stop reason: HOSPADM

## 2022-12-05 RX ORDER — METOCLOPRAMIDE HYDROCHLORIDE 5 MG/ML
INJECTION INTRAMUSCULAR; INTRAVENOUS AS NEEDED
Status: DISCONTINUED | OUTPATIENT
Start: 2022-12-05 | End: 2022-12-05 | Stop reason: HOSPADM

## 2022-12-05 RX ORDER — NALOXONE HYDROCHLORIDE 0.4 MG/ML
0.1 INJECTION, SOLUTION INTRAMUSCULAR; INTRAVENOUS; SUBCUTANEOUS AS NEEDED
Status: DISCONTINUED | OUTPATIENT
Start: 2022-12-05 | End: 2022-12-05 | Stop reason: HOSPADM

## 2022-12-05 RX ORDER — DEXAMETHASONE SODIUM PHOSPHATE 4 MG/ML
INJECTION, SOLUTION INTRA-ARTICULAR; INTRALESIONAL; INTRAMUSCULAR; INTRAVENOUS; SOFT TISSUE AS NEEDED
Status: DISCONTINUED | OUTPATIENT
Start: 2022-12-05 | End: 2022-12-05 | Stop reason: HOSPADM

## 2022-12-05 RX ORDER — GLYCOPYRROLATE 0.2 MG/ML
INJECTION INTRAMUSCULAR; INTRAVENOUS AS NEEDED
Status: DISCONTINUED | OUTPATIENT
Start: 2022-12-05 | End: 2022-12-05 | Stop reason: HOSPADM

## 2022-12-05 RX ORDER — DEXAMETHASONE SODIUM PHOSPHATE 4 MG/ML
4 INJECTION, SOLUTION INTRA-ARTICULAR; INTRALESIONAL; INTRAMUSCULAR; INTRAVENOUS; SOFT TISSUE ONCE
Status: COMPLETED | OUTPATIENT
Start: 2022-12-05 | End: 2022-12-05

## 2022-12-05 RX ORDER — METRONIDAZOLE 500 MG/100ML
500 INJECTION, SOLUTION INTRAVENOUS ONCE
Status: COMPLETED | OUTPATIENT
Start: 2022-12-05 | End: 2022-12-05

## 2022-12-05 RX ORDER — PROPOFOL 10 MG/ML
INJECTION, EMULSION INTRAVENOUS AS NEEDED
Status: DISCONTINUED | OUTPATIENT
Start: 2022-12-05 | End: 2022-12-05 | Stop reason: HOSPADM

## 2022-12-05 RX ORDER — HYDROMORPHONE HYDROCHLORIDE 1 MG/ML
0.2 INJECTION, SOLUTION INTRAMUSCULAR; INTRAVENOUS; SUBCUTANEOUS
Status: COMPLETED | OUTPATIENT
Start: 2022-12-05 | End: 2022-12-05

## 2022-12-05 RX ORDER — MIDAZOLAM HYDROCHLORIDE 1 MG/ML
1 INJECTION, SOLUTION INTRAMUSCULAR; INTRAVENOUS
Status: DISCONTINUED | OUTPATIENT
Start: 2022-12-05 | End: 2022-12-05 | Stop reason: HOSPADM

## 2022-12-05 RX ORDER — ROCURONIUM BROMIDE 10 MG/ML
INJECTION, SOLUTION INTRAVENOUS AS NEEDED
Status: DISCONTINUED | OUTPATIENT
Start: 2022-12-05 | End: 2022-12-05 | Stop reason: HOSPADM

## 2022-12-05 RX ORDER — FENTANYL CITRATE 50 UG/ML
INJECTION, SOLUTION INTRAMUSCULAR; INTRAVENOUS AS NEEDED
Status: DISCONTINUED | OUTPATIENT
Start: 2022-12-05 | End: 2022-12-05 | Stop reason: HOSPADM

## 2022-12-05 RX ORDER — HYDROCODONE BITARTRATE AND ACETAMINOPHEN 7.5; 325 MG/1; MG/1
1 TABLET ORAL
Qty: 30 TABLET | Refills: 0 | Status: SHIPPED | OUTPATIENT
Start: 2022-12-05 | End: 2022-12-10

## 2022-12-05 RX ORDER — KETAMINE HCL IN 0.9 % NACL 50 MG/5 ML
SYRINGE (ML) INTRAVENOUS AS NEEDED
Status: DISCONTINUED | OUTPATIENT
Start: 2022-12-05 | End: 2022-12-05 | Stop reason: HOSPADM

## 2022-12-05 RX ADMIN — SODIUM CHLORIDE, SODIUM LACTATE, POTASSIUM CHLORIDE, AND CALCIUM CHLORIDE 1000 ML: 600; 310; 30; 20 INJECTION, SOLUTION INTRAVENOUS at 09:41

## 2022-12-05 RX ADMIN — FENTANYL CITRATE 25 MCG: 50 INJECTION, SOLUTION INTRAMUSCULAR; INTRAVENOUS at 14:55

## 2022-12-05 RX ADMIN — MIDAZOLAM 2 MG: 1 INJECTION INTRAMUSCULAR; INTRAVENOUS at 11:04

## 2022-12-05 RX ADMIN — HYDROMORPHONE HYDROCHLORIDE 0.2 MG: 1 INJECTION, SOLUTION INTRAMUSCULAR; INTRAVENOUS; SUBCUTANEOUS at 12:59

## 2022-12-05 RX ADMIN — METRONIDAZOLE 500 MG: 500 INJECTION, SOLUTION INTRAVENOUS at 11:16

## 2022-12-05 RX ADMIN — ONDANSETRON HYDROCHLORIDE 4 MG: 2 INJECTION INTRAMUSCULAR; INTRAVENOUS at 11:21

## 2022-12-05 RX ADMIN — PROPOFOL 200 MG: 10 INJECTION, EMULSION INTRAVENOUS at 11:08

## 2022-12-05 RX ADMIN — Medication 20 MG: at 11:37

## 2022-12-05 RX ADMIN — SODIUM PHOSPHATE, DIBASIC AND SODIUM PHOSPHATE, MONOBASIC 1 ENEMA: 7; 19 ENEMA RECTAL at 09:46

## 2022-12-05 RX ADMIN — SODIUM CHLORIDE, SODIUM LACTATE, POTASSIUM CHLORIDE, AND CALCIUM CHLORIDE: 600; 310; 30; 20 INJECTION, SOLUTION INTRAVENOUS at 11:51

## 2022-12-05 RX ADMIN — HYDROMORPHONE HYDROCHLORIDE 0.2 MG: 1 INJECTION, SOLUTION INTRAMUSCULAR; INTRAVENOUS; SUBCUTANEOUS at 13:04

## 2022-12-05 RX ADMIN — METOCLOPRAMIDE 10 MG: 5 INJECTION, SOLUTION INTRAMUSCULAR; INTRAVENOUS at 11:21

## 2022-12-05 RX ADMIN — LIDOCAINE HYDROCHLORIDE 100 MG: 20 INJECTION, SOLUTION INTRAVENOUS at 11:08

## 2022-12-05 RX ADMIN — Medication 180 MG: at 11:08

## 2022-12-05 RX ADMIN — FENTANYL CITRATE 50 MCG: 0.05 INJECTION, SOLUTION INTRAMUSCULAR; INTRAVENOUS at 12:34

## 2022-12-05 RX ADMIN — GLYCOPYRROLATE 0.1 MG: 0.2 INJECTION INTRAMUSCULAR; INTRAVENOUS at 11:04

## 2022-12-05 RX ADMIN — HYDROMORPHONE HYDROCHLORIDE 0.2 MG: 1 INJECTION, SOLUTION INTRAMUSCULAR; INTRAVENOUS; SUBCUTANEOUS at 13:09

## 2022-12-05 RX ADMIN — Medication 30 MG: at 11:28

## 2022-12-05 RX ADMIN — FENTANYL CITRATE 100 MCG: 50 INJECTION, SOLUTION INTRAMUSCULAR; INTRAVENOUS at 11:04

## 2022-12-05 RX ADMIN — HYDROMORPHONE HYDROCHLORIDE 0.2 MG: 1 INJECTION, SOLUTION INTRAMUSCULAR; INTRAVENOUS; SUBCUTANEOUS at 13:17

## 2022-12-05 RX ADMIN — DEXAMETHASONE SODIUM PHOSPHATE 4 MG: 4 INJECTION, SOLUTION INTRAMUSCULAR; INTRAVENOUS at 09:41

## 2022-12-05 RX ADMIN — SODIUM CHLORIDE, SODIUM LACTATE, POTASSIUM CHLORIDE, AND CALCIUM CHLORIDE 125 ML/HR: 600; 310; 30; 20 INJECTION, SOLUTION INTRAVENOUS at 09:41

## 2022-12-05 RX ADMIN — HYDROMORPHONE HYDROCHLORIDE 0.2 MG: 1 INJECTION, SOLUTION INTRAMUSCULAR; INTRAVENOUS; SUBCUTANEOUS at 13:22

## 2022-12-05 RX ADMIN — FENTANYL CITRATE 50 MCG: 0.05 INJECTION, SOLUTION INTRAMUSCULAR; INTRAVENOUS at 12:47

## 2022-12-05 RX ADMIN — ROCURONIUM BROMIDE 10 MG: 10 INJECTION, SOLUTION INTRAVENOUS at 11:08

## 2022-12-05 RX ADMIN — DEXAMETHASONE SODIUM PHOSPHATE 8 MG: 4 INJECTION, SOLUTION INTRAMUSCULAR; INTRAVENOUS at 11:21

## 2022-12-05 NOTE — ANESTHESIA POSTPROCEDURE EVALUATION
Procedure(s):  FISTULOTOMY WIITH SETON ANORECTAL FISTULA.    general    Anesthesia Post Evaluation        Comments: Post-Anesthesia Evaluation and Assessment    Cardiovascular Function/Vital Signs  BP (!) 115/38   Pulse 74   Temp 37 °C (98.6 °F)   Resp 21   Ht 5' 7\" (1.702 m)   Wt 125.6 kg (276 lb 12.8 oz)   SpO2 95%   BMI 43.35 kg/m²     Patient is status post Procedure(s):  FISTULOTOMY WIITH SETON ANORECTAL FISTULA. Nausea/Vomiting: Controlled. Postoperative hydration reviewed and adequate. Pain:  Pain Scale 1: Visual (12/05/22 1317)  Pain Intensity 1: 5 (12/05/22 1317)   Managed. Neurological Status:   Neuro (WDL): Exceptions to WDL (12/05/22 1300)   At baseline. Mental Status and Level of Consciousness: Arousable. Pulmonary Status:   O2 Device: Nasal cannula (12/05/22 1238)   Adequate oxygenation and airway patent. Complications related to anesthesia: None    Post-anesthesia assessment completed. No concerns. Patient has met all discharge requirements. Signed By: Pennie Armijo MD    December 5, 2022                   INITIAL Post-op Vital signs:   Vitals Value Taken Time   /38 12/05/22 1321   Temp 37 °C (98.6 °F) 12/05/22 1221   Pulse 70 12/05/22 1323   Resp 14 12/05/22 1323   SpO2 96 % 12/05/22 1323   Vitals shown include unvalidated device data.

## 2022-12-05 NOTE — OP NOTES
OPERATIVE NOTE    Patient: Woodrow Arias MRN: [id number]  SSN: [id number]    YOB: 1965  Age: 64 y.o. Sex: male      Indications: This is a 64y.o. year-old male who presents with anorectal fistula with seton in place for infection control. The patient was admitted for surgery as conservative measures have failed. Date of Procedure: 12/5/2022     Preoperative Diagnosis: ANORECTAL FISTULA    Postoperative Diagnosis: ANORECTAL FISTULA      Procedure: Procedure(s):  FISTULOTOMY WIITH SETON ANORECTAL FISTULA    Surgeon(s): Surgeon(s) and Role:     Mia Zayas DO - Primary    Assistant(s): Circ-1: Ekta Burton  Scrub RN-1: Esteban Patel RN  Surg Asst-1: Alva Owens  Surg Asst-Relief: Eunice David    Anesthesia: General     Procedure: The patient prepped preprocedure with a fleets enema. Pt was counseled on the risks of the procedure including suboptimal/no effect, recurrence, bleeding, infection, wound healing problems, injury to local neurovascular structures, urinary retention requiring intervention, thrombosed external hemorrhoids, skin irritation, gas or fecal incontinence, anal stenosis, severe chronic pain, and allergic or adverse reaction to medications. After obtaining informed consent and properly identifying the patient and area of focus, the patient expressed understanding and signed consent. The patient was then taken to the operating room and was placed in prone jackknife position. All pressure points were padded. A time out was performed. The patients perineum was prepped and draped in the standard surgical fashion. MAC sedation and an anal block using 2% lidocaine and .5% Marcaine 50:50 solution was administered as well as infiltration of the hal-fistula cutaneous tract. An anoscope was placed in the anus.  Hydrogen peroxide was administered through the external opening with an angiocatheter which was located in the right gluteal region approximately 8 cm from the anal verge in the posterolateral position. A lacrimal probe was placed through the tract and a seton was placed without difficulty. A cutaneous fistulotomy was performed to make the external opening closer to the anus. The tract was debrided of epithelial tissue. Hemostasis achieved. The draining seton was secured. The wound was irrigated and a sterile dressing applied. Pt tolerated well. Follow up instructions and pain medications given. Findings: right posterior ext opening 8cm from verge with post midline internal at dentate. Large circ internal hem. Estimated Blood Loss: Minimal    Specimens: * No specimens in log *     Drains: none    Implants: * No implants in log *    Complications: None; patient tolerated the procedure well.     Lucie Ordonez DO  12/5/2022  1:02 PM

## 2022-12-05 NOTE — PERIOP NOTES
TRANSFER - IN REPORT:    Verbal report received from [facility] (name) on Steve Mathias  being received from [facility]) for routine post - op      Report consisted of patients Situation, Background, Assessment and   Recommendations(SBAR). Information from the following report(s) SBAR, OR Summary, and MAR was reviewed with the receiving nurse. Opportunity for questions and clarification was provided. Assessment completed upon patients arrival to unit and care assumed.

## 2022-12-05 NOTE — H&P
Assessment/Plan  #  Detail Type  Description   1. Assessment  Encntr for f/u exam aft trtmt for cond oth than malig neoplm (F43). Impression  Pt doing well and expected to continue to improve. Plan for next stage. Partial fistulotomy with seton on due to significant anal muscle involvement. Risks benefits and alternatives discussed with patient. Timing discussed with patient. Will place posting sheet. .    Patient Plan  Next stage may take 6-8 weeks of healing. Then depending upon muscular involvement may need 3 more months to heal for stage thereafter. May require 2-4 weeks off for work. Wound care discussed in increased pain discussed. This 64year old male presents for Post-Op. History of Present Illness  1. Post-Op   Additional information: Seton placement without fistulotomy for anorectal fistula. Patient doing well. Here to discuss next steps. Comments: A virtual telehealth visit - 2-way secure real time synchronous interactive video/audio connection was conducted today in [address] while the patient was at home. The patient was advised that our priority at this time is to keep our patients safe and provide timely access to medical care and meet important medical needs otherwise not available. Virtual visit was agreed to or at the patient's request while at home to prevent them coming into the office unless necessary. The patient provided verbal consent to discuss medical care and agreed to receiving services via telehealth and was also made aware that her insurance will be billed for this service. The patient verbalized understanding.         Problem List  Problem Description  Onset Date  Chronic  Clinical Status  Notes  Pneumonia    Y      Hemorrhoids    Y      Fatty liver    Y          Medications (active prior to today)  Medication  Instructions  Start Date  Stop Date  Refilled  Elsewhere  Vitamin C 1,000 mg tablet  take 1 tablet daily  01/08/2020      N  Vitamin D3 5,000 unit tablet  take 1 by Oral route once  01/08/2020      N  L-Glutamine 500 mg tablet  take 2 daily  01/08/2020      N  selenium 200 mcg capsule    01/08/2020      N  CoQ-10 100 mg capsule    01/08/2020      N  Glucosamine Chondroitin 550 mg-30 mg-1 mg capsule    01/08/2020      N  glucosamine HCl 1,500 mg tablet    01/08/2020      N  MagOx 400 mg (241.3 mg magnesium) tablet  Take 400 mg by mouth daily. //      Y  vitamin E 400 unit capsule  Take 400 Units by mouth daily. //      Y  MegaNatural- mg tablet,extended release  takes two tablets to equal up to 300mg  06/13/2022      N  chromium amino acid chelate    //    06/13/2022  Y  zinc gluconate 30 mg tablet    06/13/2022      N  Probiotic (S.boulardii) 250 mg capsule    06/13/2022      N  diazepam 5 mg tablet  take 1 tablet by oral route 3 times every day for relaxation  10/12/2022      N        Allergies  Ingredient  Reaction (Severity)  Medication Name  Comment  NO KNOWN ALLERGIES            Physical Exam  Exam  Findings  Details  General Exam  Comments  Incisions healing well. No evidence of infection. Medications (added, continued, or stopped this visit)  Start Date  Medication  Directions  PRN Status  PRN Reason  Instruction  Stop Date    chromium amino acid chelate    N        01/08/2020  CoQ-10 100 mg capsule    N        10/12/2022  diazepam 5 mg tablet  take 1 tablet by oral route 3 times every day for relaxation  N  relaxation      01/08/2020  Glucosamine Chondroitin 550 mg-30 mg-1 mg capsule    N        01/08/2020  glucosamine HCl 1,500 mg tablet    N        01/08/2020  L-Glutamine 500 mg tablet  take 2 daily  N          MagOx 400 mg (241.3 mg magnesium) tablet  Take 400 mg by mouth daily.   N        06/13/2022  MegaNatural- mg tablet,extended release  takes two tablets to equal up to 300mg  N        06/13/2022  Probiotic (S.boulardii) 250 mg capsule    N        01/08/2020  selenium 200 mcg capsule    N        01/08/2020  Vitamin C 1,000 mg tablet  take 1 tablet daily  N        01/08/2020  Vitamin D3 5,000 unit tablet  take 1 by Oral route once  N          vitamin E 400 unit capsule  Take 400 Units by mouth daily.   N        06/13/2022  zinc gluconate 30 mg tablet    N          Active Patient Care Team Members  Name  Contact  Agency Type  Support Role  Relationship  Active Date  Inactive Date  Specialty  Nery Perla      encounter provider        Pulmonary Medicine  Rubia Freedman      Patient provider  PCP      [facility]

## 2022-12-05 NOTE — ANESTHESIA PREPROCEDURE EVALUATION
Relevant Problems   No relevant active problems       Anesthetic History   No history of anesthetic complications            Review of Systems / Medical History  Patient summary reviewed, nursing notes reviewed and pertinent labs reviewed    Pulmonary        Sleep apnea: BiPAP      Pertinent negatives: No smoker     Neuro/Psych   Within defined limits           Cardiovascular                  Exercise tolerance: >4 METS     GI/Hepatic/Renal           Liver disease  Pertinent negatives: No hiatal hernia and GERD   Endo/Other        Morbid obesity     Other Findings              Physical Exam    Airway  Mallampati: III  TM Distance: > 6 cm  Neck ROM: normal range of motion   Mouth opening: Normal     Cardiovascular    Rhythm: regular  Rate: normal         Dental  No notable dental hx       Pulmonary  Breath sounds clear to auscultation               Abdominal  GI exam deferred       Other Findings            Anesthetic Plan    ASA: 3  Anesthesia type: general          Induction: Intravenous  Anesthetic plan and risks discussed with: Patient

## 2022-12-05 NOTE — INTERVAL H&P NOTE
Update History & Physical    The Patient's History and Physical of December 4, 2022 was reviewed with the patient and I examined the patient. There was no change. The surgical site was confirmed by the patient and me. Plan:  The risk, benefits, expected outcome, and alternative to the recommended procedure have been discussed with the patient. Patient understands and wants to proceed with the procedure.     Electronically signed by Julián Queen DO on 12/5/2022 at 8:47 AM

## 2022-12-05 NOTE — PERIOP NOTES
Reviewed PTA medication list with patient/caregiver and patient/caregiver denies any additional medications. Patient admits to having a responsible adult care for them at home for at least 24 hours after surgery. Patient encouraged to use gown warming system and informed that using said warming gown to regulate body temperature prior to a procedure has been shown to help reduce the risks of blood clots and infection. Patient's pharmacy of choice verified and documented in PTA medication section. Dual skin assessment & fall risk band verification completed with Mohini Aranda RN.

## 2022-12-05 NOTE — PERIOP NOTES
TRANSFER - IN REPORT:    Verbal report received from Kia Garcia RN (name) on Ranjana Freeman  being received from OR (unit) for routine progression of care      Report consisted of patients Situation, Background, Assessment and   Recommendations(SBAR). Information from the following report(s) Procedure Summary, Intake/Output, MAR, and Accordion was reviewed with the receiving nurse. Opportunity for questions and clarification was provided. Assessment completed upon patients arrival to unit and care assumed.

## 2022-12-05 NOTE — DISCHARGE INSTRUCTIONS
Post-Operative Discharge Instructions  Lorenza Grady DO, [address], [facility]  [address] [place] [address],  Linda Julien  [phone]    Patient: Masood Edmond MRN: [id number]  CSN: [id number]    YOB: 1965  Age: 64 y.o. Sex: male    DOA: 12/5/2022 LOS: [unfilled]  Discharge Date: [unfilled]     Acute Diagnoses:  MALIGNANT NEOPLASM    Chronic Medical Diagnoses:  [unfilled]      IMPORTANT Instructions for Patients Having   Hemorrhoid or other Anal Surgery    After Surgery:     A gelfoam plug is in the anus for post op bleeding. It can be removed later today with first BM or in bathtub. This can be a PAINFUL operation. Use the narcotics if prescribed for the first day or so. They are very constipating. Take senokot-S or miralax 1-2x a day with a large, extra glass of water to soften the stool. ICE to area for swelling. place GAUZE between buttocks at anus 3x/day to absorb moisture. Maxi pads do NOT work - only protect clothes not skin. Begin Sitz baths by soaking in a warm to hot bathtub the night of surgery and 3-4 times per day thereafter. You may eat anything you want. Expect a small amount of bleeding (several tablespoons). If the bleeding is excessive, call the office. You may drive and resume your normal activities 24-48 hours after surgery if you are not taking prescription pain-killers or muscle relaxants. If your bowels do not move in 2-3 days, use magnesium citrate. Take one bottle and repeat another bottle if no effect in 4-6hrs. Call the office if you have any problems or questions. Your post op appointment will be 4 weeks after surgery if needed. (no post op appt for botox or RBL unless problems)    Medications  1) Valium for rectal spasm  2) Calmoseptine paste to protect skin from moisture or irritation. Over the counter. 3) Balneol (anal cleansing lotion) to clean anus - avoid baby wipes and preparation H/Tucks products.  Over the counter  4) Laxative such as senokot-S, Miralax, Milk of Magnesia - start day 1 and hold for diarrhea  5) Ibuprofen 800mg every 8 hrs around the clock. 6) Topical lidocaine 5% ointment (frequently not covered by insurance. If not, get over the counter e.g. Tilman Carls- 29$)  7) If more extensive surgery, may receive narcotics but use after all else fails as an addition after exhausting all other options, NOT FIRST LINE TREATMENT. DISCHARGE SUMMARY from Nurse    PATIENT INSTRUCTIONS:    After general anesthesia or intravenous sedation, for 24 hours or while taking prescription Narcotics:  Limit your activities  Do not drive and operate hazardous machinery  Do not make important personal or business decisions  Do  not drink alcoholic beverages  If you have not urinated within 8 hours after discharge, please contact your surgeon on call. Report the following to your surgeon:  Excessive pain, swelling, redness or odor of or around the surgical area  Temperature over 100.5  Nausea and vomiting lasting longer than 4 hours or if unable to take medications  Any signs of decreased circulation or nerve impairment to extremity: change in color, persistent  numbness, tingling, coldness or increase pain  Any questions    What to do at Home:  Recommended activity: See surgical instructions,     If you experience any of the following symptoms , please follow up with Dr. Rosalind Boyce. *  Please give a list of your current medications to your Primary Care Provider. *  Please update this list whenever your medications are discontinued, doses are      changed, or new medications (including over-the-counter products) are added. *  Please carry medication information at all times in case of emergency situations.     These are general instructions for a healthy lifestyle:    No smoking/ No tobacco products/ Avoid exposure to second hand smoke  Surgeon General's Warning:  Quitting smoking now greatly reduces serious risk to your health. Obesity, smoking, and sedentary lifestyle greatly increases your risk for illness    A healthy diet, regular physical exercise & weight monitoring are important for maintaining a healthy lifestyle    You may be retaining fluid if you have a history of heart failure or if you experience any of the following symptoms:  Weight gain of 3 pounds or more overnight or 5 pounds in a week, increased swelling in our hands or feet or shortness of breath while lying flat in bed. Please call your doctor as soon as you notice any of these symptoms; do not wait until your next office visit. Patient armband removed and shredded     The discharge information has been reviewed with the patient and caregiver. The patient and caregiver verbalized understanding. Discharge medications reviewed with the patient and caregiver and appropriate educational materials and side effects teaching were provided.   ___________________________________________________________________________________________________________________________________

## 2022-12-05 NOTE — DISCHARGE SUMMARY
Discharge Summary    Patient: Markus Abad MRN: [id number]  CSN: [id number]    YOB: 1965  Age: 64 y.o. Sex: male    DOA: 12/5/2022 LOS:  LOS: 0 days   Discharge Date:      Admission Diagnoses: ANORECTAL FISTULA    Discharge Diagnoses:  ANORECTAL FISTULA  Patient Active Problem List   Diagnosis Code    Diarrhea R19.7    Syncope R55    Dehydration E86.0    S/P laparoscopic cholecystectomy Z90.49       Discharge Condition: Good    Discharge Disposition: Home    Procedure: Procedure(s):  FISTULOTOMY RANDY NINO ANORECTAL FISTULA    Surgeon(s): Surgeon(s) and Role:     Constance Rob DO [facility] Course:  Uncomplicated. Upon discharge the patient was ambulating, urinating, having bowel movements, and tolerating a diet with pain well controlled. It is expected that the patient continues to improve upon discharge. Consults: None    Significant Diagnostic Studies: none    Discharge Medications:     Current Discharge Medication List        START taking these medications    Details   HYDROcodone-acetaminophen (NORCO) 7.5-325 mg per tablet Take 1 Tablet by mouth every four (4) hours as needed for Pain for up to 5 days. Max Daily Amount: 6 Tablets. Qty: 30 Tablet, Refills: 0    Associated Diagnoses: Anorectal fistula           CONTINUE these medications which have NOT CHANGED    Details   tamsulosin (Flomax) 0.4 mg capsule Take 1 Capsule by mouth daily for 14 days. Qty: 14 Capsule, Refills: 0      ketorolac (TORADOL) 10 mg tablet Take 1 Tablet by mouth every six (6) hours as needed for Pain. Qty: 12 Tablet, Refills: 0      ondansetron hcl (Zofran) 4 mg tablet Take 1 Tablet by mouth every eight (8) hours as needed for Nausea or Vomiting. Qty: 12 Tablet, Refills: 0      naloxone (NARCAN) 4 mg/actuation nasal spray Use 1 spray intranasally, then discard. Repeat with new spray every 2 min as needed for opioid overdose symptoms, alternating nostrils.   Qty: 2 Each, Refills: 0      b complex vitamins tablet Take 1 Tab by mouth daily. cholecalciferol (VITAMIN D3) (5000 Units/125 mcg) tab tablet Take 5,000 Units by mouth daily. vitamin E (AQUA GEMS) 400 unit capsule Take 400 Units by mouth daily. glucosam-chondro-herb 149-hyal (GLUCOS CHOND CPLX ADVANCED PO) Take 1 Tab by mouth daily. fish oil-dha-epa 1,200-144-216 mg cap Take 1 Tab by mouth daily. SELENIUM PO Take 1 Tab by mouth daily. magnesium oxide (MAG-OX) 400 mg tablet Take 400 mg by mouth daily. zinc sulfate (ZINC-15 PO) Take 1 Tab by mouth daily. TURMERIC PO Take 1 Tab by mouth daily. co-enzyme Q-10 (CO Q-10) 100 mg capsule Take 100 mg by mouth daily.              Activity: Activity as tolerated and no driving for today, No driving while on analgesics, and See surgical instructions    Diet: Regular Diet    Wound Care: Keep wound clean and dry, Ice to area for comfort, and As directed    Follow-up: Wednesday for wound check and teaching

## 2022-12-05 NOTE — BRIEF OP NOTE
Brief Postoperative Note    Patient: Jono Phelps  YOB: 1965  MRN: [id number]    Date of Procedure: 12/5/2022     Pre-Op Diagnosis: ANORECTAL FISTULA    Post-Op Diagnosis: Same as preoperative diagnosis. Procedure(s):  FISTULOTOMY RANDY SETON ANORECTAL FISTULA    Surgeon(s):  Gala Klinefelter, DO    Surgical Assistant: Surg Asst-1: Alpha Fam D    Anesthesia: General     Estimated Blood Loss (mL): Minimal    Complications: None    Specimens: * No specimens in log *     Implants: * No implants in log *    Drains: * No LDAs found *    Findings: right posterior ext opening 8cm from verge with post midline internal at dentate. Large circ internal hem.     Electronically Signed by Rupa Gtz DO on 12/5/2022 at 12:42 PM

## 2023-04-05 ENCOUNTER — HOSPITAL ENCOUNTER (OUTPATIENT)
Facility: HOSPITAL | Age: 58
Setting detail: RECURRING SERIES
Discharge: HOME | End: 2023-04-08
Payer: OTHER GOVERNMENT

## 2023-04-05 PROCEDURE — 97535 SELF CARE MNGMENT TRAINING: CPT

## 2023-04-05 PROCEDURE — 97161 PT EVAL LOW COMPLEX 20 MIN: CPT

## 2023-04-05 PROCEDURE — 97110 THERAPEUTIC EXERCISES: CPT

## 2023-04-06 NOTE — PROGRESS NOTES
with ambulation that has been ongoing and has been advised to seek a follow up with orthopedics if this is persistent and limiting his function. Evaluation Complexity HistoryMEDIUM  Complexity : 1-2 comorbidities / personal factors will impact the outcome/ POC  ; Examination MEDIUM Complexity : 3 Standardized tests and measures addressin body structure, function, activity limitation and / or participation in recreation  ;Presentation LOW Complexity : Stable, uncomplicated  ;Clinical Decision Making MEDIUM Complexity : FOTO score of 26-74 FOTO score = an established functional score where 100 = no disability  Overall Complexity Rating: LOW   Problem List: pain affecting function, decrease ROM, decrease strength, decrease ADL/functional abilities, decrease activity tolerance, and decrease flexibility/joint mobility   Treatment Plan may include any combination of the followin Therapeutic Exercise, 46762 Neuromuscular Re-Education, 84637 Manual Therapy, 29661 Therapeutic Activity, 37439 Self Care/Home Management, 33374 Electrical Stim unattended, 63148 Needle Insertion w/o Injection (1 or 2 muscles), and  Needle Insertion w/o Injection (3+ muscles)  Patient / Family readiness to learn indicated by: asking questions, trying to perform skills, and interest  Persons(s) to be included in education: patient (P)  Barriers to Learning/Limitations: None  Measures taken if barriers to learning present: n/a  Patient Goal (s): \"relief from pain, ability to stand and work without severe pain\"  Patient Self Reported Health Status: fair  Rehabilitation Potential: good    Short Term Goals: To be accomplished in 4 weeks              Patient will be independent and compliant with HEP to progress toward goals and restore functional mobility. Eval Status: issued at eval     Pt will demonstrate trunk flexion AROM fingers to toes to aid in functional mechanics for ambulation/ADLs.   Eval Status: fingers to ankles with
OBJECTIVE/EXAMINATION    Physical Therapy Evaluation    Gait: [] Normal    [x] Abnormal    [] Antalgic    [] NWB    Device:  Describe: decreased stance time and early offloading on left LE reportedly due to left heel pain  Stairs:nt  Posture: mild forward shoulder posture    Range of Motion:     AROM PROM   Knee Left Right Left Right   Extension WNL WNL     Flexion WNL WNL         AROM PROM   Hip Left Right Left Right   Flexion WNL WNL WNL WNL discomfort with terminal right hip flexion in lower back   Extension [facility]   ADD       ABD       ER       IR         Trunk AROM  Flexion fingertips to ankles with tension and discomfort in right lower back  SB: right WNL, left fingertips to knee joint line with right back discomfort  Rotation: right 75%, left 50% with right LBP  Ext 75%: local soreness in lower back with repeated motion     Strength (MMT):                                       Hip Left (0-5) Right (0-5)   Hip Flexion 5 5   Hip Extension 4 4- back pain   Hip ABD 4+ 4- back pain right and right hip pain   Hip ADD 4- 4-   Hip ER 4+ 4   Hip IR 5 4 hip sore     Knee Left (0-5) Right (0-5)   Knee Flexion 5 5   Knee Extension 5 5   Ankle     Ankle PF 5 5   Ankle DF 5 5   Ankle EV     Ankle INV     Other        Sensation: minimal diminished sensation to light touch in anterolateral right thigh    Deep Tendon Reflexes  Patellar: NT Left, NT Right  Calcaneal: NT Left, NT Right    Special Tests:    Hip Left Right   Brad Test (+) (+)   Woo's (-) (-)   Scour - -   WAQAS - (+) limited hip mobility and discomfort   FADDIR - -               Palpation:   Location: right greater trochanter  Patient's Pain Response: [] Min   [x] Mod   [] Severe  Location: right PSIS, right lumbar paraspinals  Patient's Pain Response: [x] Min   [] Mod   [] Severe    Other Tests / Comments:   Thigh thrust: (-) bilat  Unilateral SLR (-) bilat  Galylc's (-) bilat  SIJ compression/distraction (-) bilat  90/90 HS: right -45, left

## 2023-04-18 ENCOUNTER — HOSPITAL ENCOUNTER (OUTPATIENT)
Facility: HOSPITAL | Age: 58
Discharge: HOME | End: 2023-04-20
Payer: OTHER GOVERNMENT

## 2023-04-18 ENCOUNTER — HOSPITAL ENCOUNTER (OUTPATIENT)
Facility: HOSPITAL | Age: 58
Discharge: HOME | End: 2023-04-21
Payer: OTHER GOVERNMENT

## 2023-04-18 DIAGNOSIS — K60.5 ANORECTAL FISTULA: ICD-10-CM

## 2023-04-18 LAB
ANION GAP SERPL CALC-SCNC: 3 MMOL/L (ref 3–18)
BUN SERPL-MCNC: 15 MG/DL (ref 7–18)
BUN/CREAT SERPL: 15 (ref 12–20)
CALCIUM SERPL-MCNC: 9.4 MG/DL (ref 8.5–10.1)
CHLORIDE SERPL-SCNC: 113 MMOL/L (ref 100–111)
CO2 SERPL-SCNC: 25 MMOL/L (ref 21–32)
CREAT SERPL-MCNC: 0.98 MG/DL (ref 0.6–1.3)
ERYTHROCYTE [DISTWIDTH] IN BLOOD BY AUTOMATED COUNT: 12.1 % (ref 11.6–14.5)
GLUCOSE SERPL-MCNC: 136 MG/DL (ref 74–99)
HCT VFR BLD AUTO: 40.4 % (ref 36–48)
HGB BLD-MCNC: 13.6 G/DL (ref 13–16)
MCH RBC QN AUTO: 30.7 PG (ref 24–34)
MCHC RBC AUTO-ENTMCNC: 33.7 G/DL (ref 31–37)
MCV RBC AUTO: 91.2 FL (ref 78–100)
NRBC # BLD: 0 K/UL (ref 0–0.01)
NRBC BLD-RTO: 0 PER 100 WBC
PLATELET # BLD AUTO: 273 K/UL (ref 135–420)
PMV BLD AUTO: 9.8 FL (ref 9.2–11.8)
POTASSIUM SERPL-SCNC: 4.2 MMOL/L (ref 3.5–5.5)
RBC # BLD AUTO: 4.43 M/UL (ref 4.35–5.65)
SODIUM SERPL-SCNC: 141 MMOL/L (ref 136–145)
WBC # BLD AUTO: 5 K/UL (ref 4.6–13.2)

## 2023-04-18 PROCEDURE — 85027 COMPLETE CBC AUTOMATED: CPT

## 2023-04-18 PROCEDURE — 80048 BASIC METABOLIC PNL TOTAL CA: CPT

## 2023-04-18 PROCEDURE — 36415 COLL VENOUS BLD VENIPUNCTURE: CPT

## 2023-04-19 ENCOUNTER — HOSPITAL ENCOUNTER (OUTPATIENT)
Facility: HOSPITAL | Age: 58
Setting detail: RECURRING SERIES
Discharge: HOME | End: 2023-04-22
Payer: OTHER GOVERNMENT

## 2023-04-19 PROCEDURE — 97530 THERAPEUTIC ACTIVITIES: CPT

## 2023-04-19 PROCEDURE — 97112 NEUROMUSCULAR REEDUCATION: CPT

## 2023-04-19 PROCEDURE — 97110 THERAPEUTIC EXERCISES: CPT

## 2023-04-19 NOTE — PROGRESS NOTES
improvement in functional status. Eval Status: FOTO 52  FOTO score = an established functional score where 100 = no disability     Pt will demonstrate (-) WAQAS to aid in functional mechanics for ambulation/ADLs. Eval Status: (+) right hip WAQAS     Pt will have 4+/5 right hip abd and ext strength to return to goals of ambulation with improved gait. Eval Status: 4-/5 with pain     Patient will improve pain in back and hip to 2/10 at worst to improve daily activity tolerance and restore prior level of function.   Eval Status: 8/10 at worst    PLAN  Yes  Continue plan of care  []  Upgrade activities as tolerated  []  Discharge due to :  []  Other:    Jackie Francis PT    4/19/2023    12:15 PM    Future Appointments   Date Time Provider Zohreh Chanel   4/21/2023 12:50 PM [facility]

## 2023-04-20 LAB
EKG ATRIAL RATE: 67 BPM
EKG DIAGNOSIS: NORMAL
EKG P AXIS: 49 DEGREES
EKG P-R INTERVAL: 156 MS
EKG Q-T INTERVAL: 384 MS
EKG QRS DURATION: 80 MS
EKG QTC CALCULATION (BAZETT): 405 MS
EKG R AXIS: 61 DEGREES
EKG T AXIS: 39 DEGREES
EKG VENTRICULAR RATE: 67 BPM

## 2023-04-21 ENCOUNTER — HOSPITAL ENCOUNTER (OUTPATIENT)
Facility: HOSPITAL | Age: 58
Setting detail: RECURRING SERIES
Discharge: HOME | End: 2023-04-24
Payer: OTHER GOVERNMENT

## 2023-04-21 PROCEDURE — 97112 NEUROMUSCULAR REEDUCATION: CPT

## 2023-04-21 PROCEDURE — 97110 THERAPEUTIC EXERCISES: CPT

## 2023-04-21 PROCEDURE — 97530 THERAPEUTIC ACTIVITIES: CPT

## 2025-01-02 ENCOUNTER — HOSPITAL ENCOUNTER (EMERGENCY)
Facility: HOSPITAL | Age: 60
Discharge: HOME | End: 2025-01-02

## (undated) DEVICE — GLOVE SURG SZ 7 L12IN FNGR THK79MIL GRN LTX FREE

## (undated) DEVICE — TRNQT TEXT 1X18IN BLU LF DISP -- CONVERT TO ITEM 362165

## (undated) DEVICE — SUT SLK 0 30IN TIE MP BLK --

## (undated) DEVICE — SPONGE GZ W4XL4IN COT 12 PLY TYP VII WVN C FLD DSGN

## (undated) DEVICE — TRAP SPEC COLL POLYP POLYSTYR --

## (undated) DEVICE — MASTISOL ADHESIVE LIQ 2/3ML

## (undated) DEVICE — GARMENT,MEDLINE,DVT,INT,CALF,MED, GEN2: Brand: MEDLINE

## (undated) DEVICE — APPLIER CLP M L L11.4IN DIA10MM ENDOSCP ROT MULT FOR LIG

## (undated) DEVICE — SLOTTED HEAD CRADLE FOAM POSITIONER: Brand: CARDINAL HEALTH

## (undated) DEVICE — SYRINGE 50ML E/T

## (undated) DEVICE — CATH SUC CTRL PRT TRIFLO 14FR --

## (undated) DEVICE — STERILE POLYISOPRENE POWDER-FREE SURGICAL GLOVES WITH EMOLLIENT COATING: Brand: PROTEXIS

## (undated) DEVICE — SYR 5ML 1/5 GRAD LL NSAF LF --

## (undated) DEVICE — GLOVE SURG SZ 65 THK91MIL LTX FREE SYN POLYISOPRENE

## (undated) DEVICE — TISSUE RETRIEVAL SYSTEM: Brand: INZII RETRIEVAL SYSTEM

## (undated) DEVICE — CATH IV AUTOGRD ORN 14GA 45MM -- INSYTE-N

## (undated) DEVICE — TROCAR: Brand: KII® OPTICAL ACCESS SYSTEM

## (undated) DEVICE — SYR 10ML CTRL LR LCK NSAF LF --

## (undated) DEVICE — TUBING, SUCTION, 1/4" X 12', STRAIGHT: Brand: MEDLINE

## (undated) DEVICE — SOL IRRIGATION INJ NACL 0.9% 500ML BTL

## (undated) DEVICE — SUT VCRL + 2-0 27IN CT2 UD -- 36/BX

## (undated) DEVICE — [HIGH FLOW INSUFFLATOR,  DO NOT USE IF PACKAGE IS DAMAGED,  KEEP DRY,  KEEP AWAY FROM SUNLIGHT,  PROTECT FROM HEAT AND RADIOACTIVE SOURCES.]: Brand: PNEUMOSURE

## (undated) DEVICE — MAJ-1414 SINGLE USE ADPATER BIOPSY VALV: Brand: SINGLE USE ADAPTOR BIOPSY VALVE

## (undated) DEVICE — SUT MONOCRYL PLUS UD 4-0 --

## (undated) DEVICE — CATH IV SAFE STR 22GX1IN BLU -- PROTECTIV PLUS

## (undated) DEVICE — SYR 3ML LL TIP 1/10ML GRAD --

## (undated) DEVICE — Z INACTIVE USE 2854097 SPONGE GZ W4XL4IN COT 12 PLY TYP VII WVN C FLD DSGN

## (undated) DEVICE — 3-0 COATED VICRYL PLUS UNDYED 1X27" SH --

## (undated) DEVICE — SYRINGE MED 10ML TRNSLUC BRL PLUNG BLK MRK POLYPR CTRL

## (undated) DEVICE — NDL PRT INJ NSAF BLNT 18GX1.5 --

## (undated) DEVICE — MINOR: Brand: MEDLINE INDUSTRIES, INC.

## (undated) DEVICE — FORCEPS BX CAP 240CM L RAD JAW 4

## (undated) DEVICE — SCISSORS ENDOSCP DIA5MM CRV MPLR CAUT W/ RATCH HNDL

## (undated) DEVICE — CANNULA CUSH AD W/ 14FT TBG

## (undated) DEVICE — DISPOSABLE SUCTION/IRRIGATOR TUBE SET WITH TIP: Brand: AHTO

## (undated) DEVICE — SET ADMIN 16ML TBNG L100IN 2 Y INJ SITE IV PIGGY BK DISP

## (undated) DEVICE — SUT VCRL + 0 36IN UR6 VIO --

## (undated) DEVICE — VISUALIZATION SYSTEM: Brand: CLEARIFY

## (undated) DEVICE — TROCAR: Brand: KII® SLEEVE

## (undated) DEVICE — DERMABOND SKIN ADH 0.7ML --

## (undated) DEVICE — PREMIUM WET SKIN PREP TRAY: Brand: MEDLINE INDUSTRIES, INC.

## (undated) DEVICE — Device

## (undated) DEVICE — INSUFFLATION NEEDLE TO ESTABLISH PNEUMOPERITONEUM.: Brand: INSUFFLATION NEEDLE

## (undated) DEVICE — WRISTBAND ID AD W2.5XL9.5CM RED VYN ADH CLSR UNI-PRINT

## (undated) DEVICE — KENDALL RADIOLUCENT FOAM MONITORING ELECTRODE RECTANGULAR SHAPE: Brand: KENDALL

## (undated) DEVICE — SINGLE PORT MANIFOLD: Brand: NEPTUNE 2

## (undated) DEVICE — VESSEL LOOPS,MAXI, BLUE: Brand: DEVON

## (undated) DEVICE — SUTURE PERMAHAND SZ 0 L30IN NONABSORBABLE BLK SILK UNIDIR SA86G

## (undated) DEVICE — LAP CHOLE: Brand: MEDLINE INDUSTRIES, INC.

## (undated) DEVICE — E-Z CLEAN, PTFE COATED, ELECTROSURGICAL LAPAROSCOPIC ELECTRODE, J-HOOK, 33 CM., SINGLE-USE, FOR USE WITH HAND CONTROL PENCIL: Brand: MEGADYNE

## (undated) DEVICE — STERILE POLYISOPRENE POWDER-FREE SURGICAL GLOVES: Brand: PROTEXIS

## (undated) DEVICE — SOLUTION LACTATED RINGERS INJECTION USP

## (undated) DEVICE — NEEDLE HYPO 21GA L1.5IN INTRAMUSCULAR S STL LATCH BVL UP

## (undated) DEVICE — SOLUTION IV 500ML 0.9% SOD CHL FLX CONT

## (undated) DEVICE — SPONGE GZ W4XL4IN RAYON POLY 4 PLY NONWOVEN FASTER WICKING

## (undated) DEVICE — SUTURE VCRL + SZ 0 L27IN ABSRB VLT L26MM UR-6 5/8 CIR VCP603H

## (undated) DEVICE — SUTURE VCRL + SZ 3-0 L27IN ABSRB UD L26MM SH 1/2 CIR VCP416H

## (undated) DEVICE — LAPAROSCOPIC TROCAR SLEEVE/SINGLE USE: Brand: KII® OPTICAL ACCESS SYSTEM

## (undated) DEVICE — NDL FLTR TIP 5 MIC 18GX1.5IN --